# Patient Record
Sex: FEMALE | Race: OTHER | URBAN - METROPOLITAN AREA
[De-identification: names, ages, dates, MRNs, and addresses within clinical notes are randomized per-mention and may not be internally consistent; named-entity substitution may affect disease eponyms.]

---

## 2017-02-23 ENCOUNTER — EMERGENCY (EMERGENCY)
Facility: HOSPITAL | Age: 32
LOS: 0 days | Discharge: HOME | End: 2017-02-23
Attending: EMERGENCY MEDICINE
Payer: MEDICARE

## 2017-02-23 VITALS
WEIGHT: 293 LBS | DIASTOLIC BLOOD PRESSURE: 75 MMHG | TEMPERATURE: 98 F | HEIGHT: 63 IN | RESPIRATION RATE: 16 BRPM | SYSTOLIC BLOOD PRESSURE: 125 MMHG | HEART RATE: 99 BPM | OXYGEN SATURATION: 98 %

## 2017-02-23 DIAGNOSIS — M79.651 PAIN IN RIGHT THIGH: ICD-10-CM

## 2017-02-23 DIAGNOSIS — M25.551 PAIN IN RIGHT HIP: ICD-10-CM

## 2017-02-23 DIAGNOSIS — N83.209 UNSPECIFIED OVARIAN CYST, UNSPECIFIED SIDE: ICD-10-CM

## 2017-02-23 DIAGNOSIS — M54.31 SCIATICA, RIGHT SIDE: ICD-10-CM

## 2017-02-23 PROCEDURE — 93971 EXTREMITY STUDY: CPT | Mod: 26,RT

## 2017-02-23 PROCEDURE — 99284 EMERGENCY DEPT VISIT MOD MDM: CPT

## 2017-02-23 RX ORDER — BACLOFEN 100 %
1 POWDER (GRAM) MISCELLANEOUS
Qty: 20 | Refills: 0
Start: 2017-02-23 | End: 2017-03-05

## 2017-02-23 RX ORDER — TRAMADOL HYDROCHLORIDE 50 MG/1
1 TABLET ORAL
Qty: 18 | Refills: 0
Start: 2017-02-23 | End: 2017-03-01

## 2017-02-23 RX ORDER — METHOCARBAMOL 500 MG/1
750 TABLET, FILM COATED ORAL ONCE
Qty: 0 | Refills: 0 | Status: COMPLETED | OUTPATIENT
Start: 2017-02-23 | End: 2017-02-23

## 2017-02-23 RX ORDER — KETOROLAC TROMETHAMINE 30 MG/ML
30 SYRINGE (ML) INJECTION ONCE
Qty: 0 | Refills: 0 | Status: DISCONTINUED | OUTPATIENT
Start: 2017-02-23 | End: 2017-02-23

## 2017-02-23 RX ADMIN — Medication 30 MILLIGRAM(S): at 14:26

## 2017-02-23 RX ADMIN — Medication 30 MILLIGRAM(S): at 14:27

## 2017-02-23 RX ADMIN — METHOCARBAMOL 750 MILLIGRAM(S): 500 TABLET, FILM COATED ORAL at 14:27

## 2017-02-23 NOTE — ED ADULT NURSE NOTE - ADDITIONAL PRINTED INSTRUCTIONS GIVEN
discharged home, instructed to follow up with primary md or to come back to er if symptoms worsens, verbalized understanding of instrucs

## 2017-02-23 NOTE — ED ADULT NURSE NOTE - OBJECTIVE STATEMENT
patient stated that pain to right groin that radiates to right thigh up to right knee started yesterday, pain has been getting worse today, pain of 10 out of 10 on a scale of 0 to 10

## 2017-02-23 NOTE — ED PROVIDER NOTE - CONSTITUTIONAL, MLM
normal... Well appearing, well nourished, awake, alert, oriented to person, place, time/situation and in no apparent distress. Obese.

## 2017-06-12 ENCOUNTER — EMERGENCY (EMERGENCY)
Facility: HOSPITAL | Age: 32
LOS: 0 days | Discharge: HOME | End: 2017-06-12
Attending: EMERGENCY MEDICINE
Payer: MEDICARE

## 2017-06-12 VITALS
WEIGHT: 293 LBS | RESPIRATION RATE: 18 BRPM | HEART RATE: 89 BPM | SYSTOLIC BLOOD PRESSURE: 128 MMHG | OXYGEN SATURATION: 97 % | HEIGHT: 63 IN | TEMPERATURE: 98 F | DIASTOLIC BLOOD PRESSURE: 66 MMHG

## 2017-06-12 VITALS
TEMPERATURE: 98 F | HEART RATE: 99 BPM | OXYGEN SATURATION: 99 % | DIASTOLIC BLOOD PRESSURE: 81 MMHG | SYSTOLIC BLOOD PRESSURE: 141 MMHG | RESPIRATION RATE: 16 BRPM

## 2017-06-12 PROCEDURE — 99284 EMERGENCY DEPT VISIT MOD MDM: CPT | Mod: 25

## 2017-06-12 RX ORDER — DIAZEPAM 5 MG
5 TABLET ORAL ONCE
Qty: 0 | Refills: 0 | Status: DISCONTINUED | OUTPATIENT
Start: 2017-06-12 | End: 2017-06-12

## 2017-06-12 RX ORDER — CYCLOBENZAPRINE HYDROCHLORIDE 10 MG/1
10 TABLET, FILM COATED ORAL ONCE
Qty: 0 | Refills: 0 | Status: COMPLETED | OUTPATIENT
Start: 2017-06-12 | End: 2017-06-12

## 2017-06-12 RX ORDER — KETOROLAC TROMETHAMINE 30 MG/ML
60 SYRINGE (ML) INJECTION ONCE
Qty: 0 | Refills: 0 | Status: DISCONTINUED | OUTPATIENT
Start: 2017-06-12 | End: 2017-06-12

## 2017-06-12 RX ADMIN — CYCLOBENZAPRINE HYDROCHLORIDE 10 MILLIGRAM(S): 10 TABLET, FILM COATED ORAL at 07:58

## 2017-06-12 RX ADMIN — Medication 60 MILLIGRAM(S): at 09:38

## 2017-06-12 RX ADMIN — Medication 5 MILLIGRAM(S): at 09:38

## 2017-06-12 RX ADMIN — Medication 60 MILLIGRAM(S): at 07:58

## 2017-06-12 NOTE — ED ADULT NURSE NOTE - OBJECTIVE STATEMENT
right lower back pain radiating down leg started last night also has pain to hand history of sciatica. no injury medicated as ordered

## 2017-06-12 NOTE — ED PROVIDER NOTE - PHYSICAL EXAMINATION
GEN: Alert, NAD  HEAD: atraumatic, EOMI, PERRL, normal lids/conjunctiva  ENT: normal hearing, patent oropharynx without erythema/exudate, uvula midline  NECK: +supple, no tenderness/meningismus, +Trachea midline  PULM: Bilateral BS, normal resp effort, no wheeze/stridor/retractions  CV: RRR, no M/R/G, +dist ext pulses  ABD: soft, NT/ND  BACK: no CVAT, no midline tenderness  MSK: no edema/erythema/cyanosis  SKIN: no rash  NEURO: AAOx3, no sensory/motor deficits, CN 2-12 intact  mod R lower back pain w palpation, able to dorsi/plantar flex b/l feet

## 2017-06-12 NOTE — ED PROVIDER NOTE - PRESENTING SYMPTOMS DETAILS
32F w hx of sciatica coems in w acute on chronic pain. started yesterday after sitting in the hospital for another friend in a chair for several hrs. when she got up she states she started to have the pain - Rlower back radiates to R leg. no trauma/falls.  no focal weakness/numbness/incontinence/saddle anesthesia. able to ambulate.   ROS: No fever/chills, No photophobia/eye pain/changes in vision, No ear pain/sore throat/dysphagia, No chest pain/palpitations, no SOB/cough/wheeze/stridor, No abdominal pain/N/V/D, no dysuria/frequency/discharge, No neck pain, no rash, no changes in neurological status/function.

## 2017-06-12 NOTE — ED PROVIDER NOTE - MEDICAL DECISION MAKING DETAILS
pt feels better after pain meds. pt appears well and non-toxic. . VSS. Sx have improved during ED stay. No acute events during ED observation period. Precautions given to return to the ED if sx persist or change. Pt expresses understanding and has no further questions. Pt feels comfortable and wishes to be discharged home. Instructed to follow up with PMD in 24 hrs. ortho

## 2017-06-13 DIAGNOSIS — N83.209 UNSPECIFIED OVARIAN CYST, UNSPECIFIED SIDE: ICD-10-CM

## 2017-06-13 DIAGNOSIS — M54.9 DORSALGIA, UNSPECIFIED: ICD-10-CM

## 2017-06-13 DIAGNOSIS — M54.31 SCIATICA, RIGHT SIDE: ICD-10-CM

## 2017-06-27 ENCOUNTER — EMERGENCY (EMERGENCY)
Facility: HOSPITAL | Age: 32
LOS: 0 days | Discharge: HOME | End: 2017-06-27
Attending: EMERGENCY MEDICINE
Payer: MEDICARE

## 2017-06-27 VITALS
RESPIRATION RATE: 16 BRPM | HEART RATE: 85 BPM | WEIGHT: 293 LBS | SYSTOLIC BLOOD PRESSURE: 135 MMHG | OXYGEN SATURATION: 97 % | HEIGHT: 63 IN | TEMPERATURE: 98 F | DIASTOLIC BLOOD PRESSURE: 82 MMHG

## 2017-06-27 VITALS
RESPIRATION RATE: 17 BRPM | HEART RATE: 89 BPM | OXYGEN SATURATION: 100 % | DIASTOLIC BLOOD PRESSURE: 78 MMHG | TEMPERATURE: 98 F | SYSTOLIC BLOOD PRESSURE: 132 MMHG

## 2017-06-27 LAB
APPEARANCE UR: ABNORMAL
BACTERIA # UR AUTO: ABNORMAL
BILIRUB UR-MCNC: NEGATIVE — SIGNIFICANT CHANGE UP
COLOR SPEC: YELLOW — SIGNIFICANT CHANGE UP
DIFF PNL FLD: ABNORMAL
EPI CELLS # UR: ABNORMAL
GLUCOSE UR QL: NEGATIVE MG/DL — SIGNIFICANT CHANGE UP
HCG UR QL: NEGATIVE — SIGNIFICANT CHANGE UP
HYALINE CASTS # UR AUTO: ABNORMAL /LPF
KETONES UR-MCNC: NEGATIVE — SIGNIFICANT CHANGE UP
LEUKOCYTE ESTERASE UR-ACNC: ABNORMAL
NITRITE UR-MCNC: NEGATIVE — SIGNIFICANT CHANGE UP
PH UR: 5 — SIGNIFICANT CHANGE UP (ref 5–8)
PROT UR-MCNC: 30 MG/DL
RBC CASTS # UR COMP ASSIST: ABNORMAL /HPF (ref 0–4)
SP GR SPEC: 1.02 — SIGNIFICANT CHANGE UP (ref 1.01–1.02)
UROBILINOGEN FLD QL: NEGATIVE MG/DL — SIGNIFICANT CHANGE UP
WBC UR QL: SIGNIFICANT CHANGE UP

## 2017-06-27 PROCEDURE — 72131 CT LUMBAR SPINE W/O DYE: CPT | Mod: 26

## 2017-06-27 PROCEDURE — 99284 EMERGENCY DEPT VISIT MOD MDM: CPT

## 2017-06-27 PROCEDURE — 76377 3D RENDER W/INTRP POSTPROCES: CPT | Mod: 26

## 2017-06-27 RX ORDER — DEXAMETHASONE 0.5 MG/5ML
8 ELIXIR ORAL ONCE
Qty: 0 | Refills: 0 | Status: COMPLETED | OUTPATIENT
Start: 2017-06-27 | End: 2017-06-27

## 2017-06-27 RX ORDER — COLISTIN SULFATE, NEOMYCIN SULFATE, THONZONIUM BROMIDE AND HYDROCORTISONE ACETATE 3; 3.3; .5; 1 MG/ML; MG/ML; MG/ML; MG/ML
3 SUSPENSION AURICULAR (OTIC)
Qty: 1 | Refills: 0
Start: 2017-06-27 | End: 2017-07-04

## 2017-06-27 RX ADMIN — Medication 1 TABLET(S): at 13:26

## 2017-06-27 RX ADMIN — Medication 8 MILLIGRAM(S): at 14:27

## 2017-06-27 NOTE — ED PROVIDER NOTE - CARE PLAN
Principal Discharge DX:	Otitis media  Secondary Diagnosis:	URI (upper respiratory infection)  Secondary Diagnosis:	Sciatica

## 2017-06-27 NOTE — ED PROVIDER NOTE - CONSTITUTIONAL, MLM
normal... Well appearing, well nourished, awake, alert, obese, oriented to person, place, time/situation and in no apparent distress.

## 2017-06-27 NOTE — ED PROVIDER NOTE - OBJECTIVE STATEMENT
33 yo female presents with left ear pain since yesterday, uri symptoms and pain to left side of buttock radiating into left leg. Patient states she had fever last night, palpable. Patient denies cough, abdominal pain, chills, dysuria.

## 2017-06-27 NOTE — ED ADULT TRIAGE NOTE - CHIEF COMPLAINT QUOTE
multiple c/o pain eft hip to foot, left ear pain, numbness both hands denies known injury or known cause

## 2017-06-28 DIAGNOSIS — J06.9 ACUTE UPPER RESPIRATORY INFECTION, UNSPECIFIED: ICD-10-CM

## 2017-06-28 DIAGNOSIS — H66.90 OTITIS MEDIA, UNSPECIFIED, UNSPECIFIED EAR: ICD-10-CM

## 2017-06-28 DIAGNOSIS — M54.30 SCIATICA, UNSPECIFIED SIDE: ICD-10-CM

## 2017-06-28 DIAGNOSIS — N83.209 UNSPECIFIED OVARIAN CYST, UNSPECIFIED SIDE: ICD-10-CM

## 2017-06-28 DIAGNOSIS — F32.9 MAJOR DEPRESSIVE DISORDER, SINGLE EPISODE, UNSPECIFIED: ICD-10-CM

## 2017-06-28 DIAGNOSIS — H92.02 OTALGIA, LEFT EAR: ICD-10-CM

## 2017-08-04 ENCOUNTER — OUTPATIENT (OUTPATIENT)
Dept: OUTPATIENT SERVICES | Facility: HOSPITAL | Age: 32
LOS: 1 days | End: 2017-08-04

## 2017-08-04 ENCOUNTER — APPOINTMENT (OUTPATIENT)
Dept: OBGYN | Facility: HOSPITAL | Age: 32
End: 2017-08-04

## 2017-08-07 DIAGNOSIS — R73.03 PREDIABETES: ICD-10-CM

## 2017-11-07 ENCOUNTER — EMERGENCY (EMERGENCY)
Facility: HOSPITAL | Age: 32
LOS: 0 days | Discharge: HOME | End: 2017-11-07
Attending: EMERGENCY MEDICINE
Payer: MEDICARE

## 2017-11-07 VITALS
RESPIRATION RATE: 18 BRPM | TEMPERATURE: 99 F | DIASTOLIC BLOOD PRESSURE: 77 MMHG | OXYGEN SATURATION: 99 % | SYSTOLIC BLOOD PRESSURE: 139 MMHG | WEIGHT: 289.91 LBS | HEART RATE: 76 BPM | HEIGHT: 63 IN

## 2017-11-07 DIAGNOSIS — F32.9 MAJOR DEPRESSIVE DISORDER, SINGLE EPISODE, UNSPECIFIED: ICD-10-CM

## 2017-11-07 DIAGNOSIS — M25.512 PAIN IN LEFT SHOULDER: ICD-10-CM

## 2017-11-07 DIAGNOSIS — M54.30 SCIATICA, UNSPECIFIED SIDE: ICD-10-CM

## 2017-11-07 DIAGNOSIS — M54.12 RADICULOPATHY, CERVICAL REGION: ICD-10-CM

## 2017-11-07 PROCEDURE — 99283 EMERGENCY DEPT VISIT LOW MDM: CPT

## 2017-11-07 RX ORDER — BACLOFEN 100 %
1 POWDER (GRAM) MISCELLANEOUS
Qty: 20 | Refills: 0
Start: 2017-11-07 | End: 2017-11-17

## 2017-11-07 NOTE — ED PROVIDER NOTE - CONSTITUTIONAL, MLM
normal... Well appearing, well nourished, awake, alert, oriented to person, place, time/situation and in no apparent distress, obese.

## 2017-11-07 NOTE — ED ADULT NURSE NOTE - OBJECTIVE STATEMENT
received ft c/o pain l shoulder radiating down l arm x 2 days with intermittant parathesias l hand denies recent injury or known trauma

## 2017-11-07 NOTE — ED ADULT TRIAGE NOTE - CHIEF COMPLAINT QUOTE
pt complaining of left arm pain, and tingling times 2 days. pt denies injury. denies any medical history.

## 2017-11-10 ENCOUNTER — EMERGENCY (EMERGENCY)
Facility: HOSPITAL | Age: 32
LOS: 0 days | Discharge: HOME | End: 2017-11-11
Attending: EMERGENCY MEDICINE
Payer: MEDICARE

## 2017-11-10 VITALS
OXYGEN SATURATION: 96 % | SYSTOLIC BLOOD PRESSURE: 144 MMHG | WEIGHT: 283.96 LBS | HEART RATE: 100 BPM | TEMPERATURE: 98 F | HEIGHT: 63 IN | DIASTOLIC BLOOD PRESSURE: 81 MMHG

## 2017-11-10 DIAGNOSIS — Z79.891 LONG TERM (CURRENT) USE OF OPIATE ANALGESIC: ICD-10-CM

## 2017-11-10 DIAGNOSIS — N83.209 UNSPECIFIED OVARIAN CYST, UNSPECIFIED SIDE: ICD-10-CM

## 2017-11-10 DIAGNOSIS — Z79.2 LONG TERM (CURRENT) USE OF ANTIBIOTICS: ICD-10-CM

## 2017-11-10 DIAGNOSIS — M54.30 SCIATICA, UNSPECIFIED SIDE: ICD-10-CM

## 2017-11-10 DIAGNOSIS — E66.9 OBESITY, UNSPECIFIED: ICD-10-CM

## 2017-11-10 DIAGNOSIS — F32.9 MAJOR DEPRESSIVE DISORDER, SINGLE EPISODE, UNSPECIFIED: ICD-10-CM

## 2017-11-10 DIAGNOSIS — J02.9 ACUTE PHARYNGITIS, UNSPECIFIED: ICD-10-CM

## 2017-11-10 DIAGNOSIS — R50.9 FEVER, UNSPECIFIED: ICD-10-CM

## 2017-11-10 PROCEDURE — 99283 EMERGENCY DEPT VISIT LOW MDM: CPT | Mod: 25

## 2017-11-10 NOTE — ED ADULT NURSE NOTE - CHIEF COMPLAINT QUOTE
" Today around 530, I started to have a weird pain in my throat, I cannot swallow or talk" Patient denies any drooling, able to make clear sentences

## 2017-11-10 NOTE — ED ADULT NURSE NOTE - OBJECTIVE STATEMENT
patient is a 32yr old female, a&Ox3, per patient " Today around 530, I started to have a weird pain in my throat, I cannot swallow or talk" Patient denies any drooling, able to make clear sentences. no distress/observed

## 2017-11-11 RX ORDER — BENZOCAINE AND MENTHOL 5; 1 G/100ML; G/100ML
1 LIQUID ORAL ONCE
Qty: 0 | Refills: 0 | Status: COMPLETED | OUTPATIENT
Start: 2017-11-11 | End: 2017-11-11

## 2017-11-11 RX ORDER — AZITHROMYCIN 500 MG/1
1 TABLET, FILM COATED ORAL
Qty: 1 | Refills: 0
Start: 2017-11-11

## 2017-11-11 RX ORDER — IBUPROFEN 200 MG
1 TABLET ORAL
Qty: 15 | Refills: 0
Start: 2017-11-11 | End: 2017-11-16

## 2017-11-11 RX ORDER — IBUPROFEN 200 MG
600 TABLET ORAL ONCE
Qty: 0 | Refills: 0 | Status: COMPLETED | OUTPATIENT
Start: 2017-11-11 | End: 2017-11-11

## 2017-11-11 RX ADMIN — BENZOCAINE AND MENTHOL 1 LOZENGE: 5; 1 LIQUID ORAL at 02:01

## 2017-11-11 RX ADMIN — Medication 600 MILLIGRAM(S): at 01:18

## 2017-11-11 RX ADMIN — Medication 600 MILLIGRAM(S): at 01:03

## 2017-11-11 NOTE — ED PROVIDER NOTE - MEDICAL DECISION MAKING DETAILS
Pt likely with viral, no s/s of bacterial. however, pt requesting abx. d/w pt if still not improved, rx for azithro given. Discussed results and outcome of testing with the patient.  Patient given copy of available results. Patient advised to please follow up with their PMD within the next 24 hours and return to the Emergency Department for worsening symptoms or any other concerns.

## 2017-11-11 NOTE — ED PROVIDER NOTE - PHYSICAL EXAMINATION
Gen: Alert, Well appearing. NAD    Head: NC, AT, PERRL, EOMI, normal lids/conjunctiva   ENT: Bilateral TM WNL, normal hearing, patent oropharynx without erythema/exudate, uvula midline. FROm of neck. no trismus. no tongue lifting.  Neck: supple, no tenderness/meningismus/JVD   Pulm: Bilateral clear BS, normal resp effort, no wheeze/stridor/retractions  CV: RRR, no M/R/G, +dist pulses   Abd: soft, NT/ND, +BS, no guarding/rebound tenderness  Mskel: no edema/erythema/cyanosis   Skin: no rash   Neuro: AAOx3, no sensory/motor deficits, CN 2-12 intact

## 2017-11-11 NOTE — ED PROVIDER NOTE - OBJECTIVE STATEMENT
31yo obese female with pmh sciatica presents with left throat pain today and pain with eating. Pt subjective fever. Pt seen 3 days ago for left arm pain and given flexeril, prednisone and also treated for UTI and on abx. no cough, runny nose. No other analgesia taken.    ROS: No fever/chills. No photophobia/eye pain/changes in vision, No ear pain/+sore throat/dysphagia, No chest pain/palpitations. No SOB/cough/stridor. No abdominal pain, N/V/D, no black/bloody bm. No dysuria/frequency/discharge, No headache. No Dizziness.  No rash.  No numbness/tingling/weakness.

## 2017-11-13 LAB
CULTURE RESULTS: SIGNIFICANT CHANGE UP
SPECIMEN SOURCE: SIGNIFICANT CHANGE UP

## 2018-11-28 ENCOUNTER — EMERGENCY (EMERGENCY)
Facility: HOSPITAL | Age: 33
LOS: 0 days | Discharge: HOME | End: 2018-11-29
Attending: EMERGENCY MEDICINE
Payer: MEDICARE

## 2018-11-28 VITALS
SYSTOLIC BLOOD PRESSURE: 147 MMHG | HEART RATE: 70 BPM | WEIGHT: 223.99 LBS | TEMPERATURE: 99 F | RESPIRATION RATE: 19 BRPM | DIASTOLIC BLOOD PRESSURE: 86 MMHG | OXYGEN SATURATION: 100 % | HEIGHT: 63 IN

## 2018-11-28 DIAGNOSIS — Z98.84 BARIATRIC SURGERY STATUS: Chronic | ICD-10-CM

## 2018-11-28 LAB
ALBUMIN SERPL ELPH-MCNC: 3.5 G/DL — SIGNIFICANT CHANGE UP (ref 3.3–5)
ALP SERPL-CCNC: 58 U/L — SIGNIFICANT CHANGE UP (ref 40–120)
ALT FLD-CCNC: 18 U/L — SIGNIFICANT CHANGE UP (ref 12–78)
ANION GAP SERPL CALC-SCNC: 9 MMOL/L — SIGNIFICANT CHANGE UP (ref 5–17)
APTT BLD: 42.9 SEC — HIGH (ref 28.5–37)
AST SERPL-CCNC: 22 U/L — SIGNIFICANT CHANGE UP (ref 15–37)
BASOPHILS # BLD AUTO: 0.05 K/UL — SIGNIFICANT CHANGE UP (ref 0–0.2)
BASOPHILS NFR BLD AUTO: 0.5 % — SIGNIFICANT CHANGE UP (ref 0–2)
BILIRUB SERPL-MCNC: 0.7 MG/DL — SIGNIFICANT CHANGE UP (ref 0.2–1.2)
BUN SERPL-MCNC: 13 MG/DL — SIGNIFICANT CHANGE UP (ref 7–23)
CALCIUM SERPL-MCNC: 8.4 MG/DL — LOW (ref 8.5–10.1)
CHLORIDE SERPL-SCNC: 108 MMOL/L — SIGNIFICANT CHANGE UP (ref 96–108)
CK MB CFR SERPL CALC: <1 NG/ML — SIGNIFICANT CHANGE UP (ref 0.5–3.6)
CO2 SERPL-SCNC: 23 MMOL/L — SIGNIFICANT CHANGE UP (ref 22–31)
CREAT SERPL-MCNC: 0.63 MG/DL — SIGNIFICANT CHANGE UP (ref 0.5–1.3)
D DIMER BLD IA.RAPID-MCNC: <150 NG/ML DDU — SIGNIFICANT CHANGE UP
EOSINOPHIL # BLD AUTO: 0.25 K/UL — SIGNIFICANT CHANGE UP (ref 0–0.5)
EOSINOPHIL NFR BLD AUTO: 2.5 % — SIGNIFICANT CHANGE UP (ref 0–6)
GLUCOSE SERPL-MCNC: 94 MG/DL — SIGNIFICANT CHANGE UP (ref 70–99)
HCT VFR BLD CALC: 35.9 % — SIGNIFICANT CHANGE UP (ref 34.5–45)
HGB BLD-MCNC: 11.7 G/DL — SIGNIFICANT CHANGE UP (ref 11.5–15.5)
IMM GRANULOCYTES NFR BLD AUTO: 0.2 % — SIGNIFICANT CHANGE UP (ref 0–1.5)
INR BLD: 1.16 RATIO — SIGNIFICANT CHANGE UP (ref 0.88–1.16)
LIDOCAIN IGE QN: 129 U/L — SIGNIFICANT CHANGE UP (ref 73–393)
LYMPHOCYTES # BLD AUTO: 4.05 K/UL — HIGH (ref 1–3.3)
LYMPHOCYTES # BLD AUTO: 40.3 % — SIGNIFICANT CHANGE UP (ref 13–44)
MCHC RBC-ENTMCNC: 28.5 PG — SIGNIFICANT CHANGE UP (ref 27–34)
MCHC RBC-ENTMCNC: 32.6 GM/DL — SIGNIFICANT CHANGE UP (ref 32–36)
MCV RBC AUTO: 87.3 FL — SIGNIFICANT CHANGE UP (ref 80–100)
MONOCYTES # BLD AUTO: 0.53 K/UL — SIGNIFICANT CHANGE UP (ref 0–0.9)
MONOCYTES NFR BLD AUTO: 5.3 % — SIGNIFICANT CHANGE UP (ref 2–14)
NEUTROPHILS # BLD AUTO: 5.16 K/UL — SIGNIFICANT CHANGE UP (ref 1.8–7.4)
NEUTROPHILS NFR BLD AUTO: 51.2 % — SIGNIFICANT CHANGE UP (ref 43–77)
NRBC # BLD: 0 /100 WBCS — SIGNIFICANT CHANGE UP (ref 0–0)
NT-PROBNP SERPL-SCNC: 58 PG/ML — SIGNIFICANT CHANGE UP (ref 0–125)
PLATELET # BLD AUTO: 360 K/UL — SIGNIFICANT CHANGE UP (ref 150–400)
POTASSIUM SERPL-MCNC: 3.9 MMOL/L — SIGNIFICANT CHANGE UP (ref 3.5–5.3)
POTASSIUM SERPL-SCNC: 3.9 MMOL/L — SIGNIFICANT CHANGE UP (ref 3.5–5.3)
PROT SERPL-MCNC: 7.5 GM/DL — SIGNIFICANT CHANGE UP (ref 6–8.3)
PROTHROM AB SERPL-ACNC: 13.1 SEC — HIGH (ref 10–12.9)
RBC # BLD: 4.11 M/UL — SIGNIFICANT CHANGE UP (ref 3.8–5.2)
RBC # FLD: 13.7 % — SIGNIFICANT CHANGE UP (ref 10.3–14.5)
SODIUM SERPL-SCNC: 140 MMOL/L — SIGNIFICANT CHANGE UP (ref 135–145)
TROPONIN I SERPL-MCNC: <.015 NG/ML — SIGNIFICANT CHANGE UP (ref 0.01–0.04)
WBC # BLD: 10.06 K/UL — SIGNIFICANT CHANGE UP (ref 3.8–10.5)
WBC # FLD AUTO: 10.06 K/UL — SIGNIFICANT CHANGE UP (ref 3.8–10.5)

## 2018-11-28 PROCEDURE — 99284 EMERGENCY DEPT VISIT MOD MDM: CPT

## 2018-11-28 PROCEDURE — 71045 X-RAY EXAM CHEST 1 VIEW: CPT | Mod: 26

## 2018-11-28 NOTE — ED ADULT NURSE REASSESSMENT NOTE - NS ED NURSE REASSESS COMMENT FT1
patient update on status of visit. Sitting in [place] with GF. Brought in to 27 at this time. No distress noted. Patient and GF ate [employer] while in [place]

## 2018-11-28 NOTE — ED ADULT TRIAGE NOTE - CHIEF COMPLAINT QUOTE
Patient states feeling numbness in left arm and pain a few days ago but yesterday pain became worst and radiating to neck/jaw. Now also having chest pain. Denies any N/V but confirms headaches 7/10 periodically.  GF states hands gets numb and veins become more prominent. Has been massaging her neck to help

## 2018-11-29 VITALS
HEART RATE: 72 BPM | TEMPERATURE: 98 F | OXYGEN SATURATION: 100 % | DIASTOLIC BLOOD PRESSURE: 80 MMHG | SYSTOLIC BLOOD PRESSURE: 126 MMHG | RESPIRATION RATE: 16 BRPM

## 2018-11-29 PROBLEM — F32.9 MAJOR DEPRESSIVE DISORDER, SINGLE EPISODE, UNSPECIFIED: Chronic | Status: ACTIVE | Noted: 2017-06-27

## 2018-11-29 LAB
APPEARANCE UR: CLEAR — SIGNIFICANT CHANGE UP
BILIRUB UR-MCNC: NEGATIVE — SIGNIFICANT CHANGE UP
COLOR SPEC: YELLOW — SIGNIFICANT CHANGE UP
DIFF PNL FLD: ABNORMAL
EPI CELLS # UR: ABNORMAL
GLUCOSE UR QL: NEGATIVE MG/DL — SIGNIFICANT CHANGE UP
KETONES UR-MCNC: ABNORMAL
LEUKOCYTE ESTERASE UR-ACNC: NEGATIVE — SIGNIFICANT CHANGE UP
NITRITE UR-MCNC: NEGATIVE — SIGNIFICANT CHANGE UP
PH UR: 5 — SIGNIFICANT CHANGE UP (ref 5–8)
PROT UR-MCNC: 30 MG/DL
RBC CASTS # UR COMP ASSIST: SIGNIFICANT CHANGE UP /HPF (ref 0–4)
SP GR SPEC: 1.02 — SIGNIFICANT CHANGE UP (ref 1.01–1.02)
UROBILINOGEN FLD QL: 1 MG/DL
WBC UR QL: SIGNIFICANT CHANGE UP

## 2018-11-29 RX ORDER — CYCLOBENZAPRINE HYDROCHLORIDE 10 MG/1
1 TABLET, FILM COATED ORAL
Qty: 15 | Refills: 0
Start: 2018-11-29 | End: 2018-12-03

## 2018-11-29 RX ORDER — CYCLOBENZAPRINE HYDROCHLORIDE 10 MG/1
10 TABLET, FILM COATED ORAL ONCE
Qty: 0 | Refills: 0 | Status: COMPLETED | OUTPATIENT
Start: 2018-11-29 | End: 2018-11-29

## 2018-11-29 RX ORDER — IBUPROFEN 200 MG
1 TABLET ORAL
Qty: 28 | Refills: 0
Start: 2018-11-29 | End: 2018-12-05

## 2018-11-29 RX ORDER — IBUPROFEN 200 MG
600 TABLET ORAL ONCE
Qty: 0 | Refills: 0 | Status: COMPLETED | OUTPATIENT
Start: 2018-11-29 | End: 2018-11-29

## 2018-11-29 RX ADMIN — CYCLOBENZAPRINE HYDROCHLORIDE 10 MILLIGRAM(S): 10 TABLET, FILM COATED ORAL at 02:29

## 2018-11-29 RX ADMIN — Medication 600 MILLIGRAM(S): at 02:30

## 2018-11-29 RX ADMIN — Medication 600 MILLIGRAM(S): at 03:12

## 2018-11-29 NOTE — ED PROVIDER NOTE - OBJECTIVE STATEMENT
Pt is a 32 yo lady with a pmhx of gastric sleeve, arthritis who presents to the ED with neck pain shooting down arm. Has been having this pain for the past week, constant. Does have some involvement of upper chest, but no other chest pain. No sob, no pleuritic pain, no intermittent pain. No abdominal pain. Has had this issue in the past, was told she had nerve impingement, did not f/u w orthopedics. No numbness or weakness. No hx of dvt/pe, not on ocp.

## 2018-11-29 NOTE — ED ADULT NURSE REASSESSMENT NOTE - NS ED NURSE REASSESS COMMENT FT1
Assuming patient's care for coverage. Report received from RN and patient informed during rounding. Assessment available on KB. Will continue to monitor

## 2018-11-29 NOTE — ED ADULT NURSE NOTE - OBJECTIVE STATEMENT
Patient c/o pain, numbness and intermittent swelling in left arm that radiates from hand up the arm and across left anterior chest wall.

## 2018-11-29 NOTE — ED PROVIDER NOTE - MEDICAL DECISION MAKING DETAILS
Ddx: 1 wk constant pain not consistent with acs or pe/ radiculopathy/ no trauma or midline pain to suggest fx  Plan: cbc, cmp, troponin, ddimer, ecg, cxr, reassess

## 2018-11-30 DIAGNOSIS — R07.9 CHEST PAIN, UNSPECIFIED: ICD-10-CM

## 2018-11-30 DIAGNOSIS — M54.12 RADICULOPATHY, CERVICAL REGION: ICD-10-CM

## 2018-11-30 DIAGNOSIS — F32.9 MAJOR DEPRESSIVE DISORDER, SINGLE EPISODE, UNSPECIFIED: ICD-10-CM

## 2018-11-30 DIAGNOSIS — Z98.84 BARIATRIC SURGERY STATUS: ICD-10-CM

## 2018-11-30 DIAGNOSIS — M54.30 SCIATICA, UNSPECIFIED SIDE: ICD-10-CM

## 2018-11-30 DIAGNOSIS — N83.209 UNSPECIFIED OVARIAN CYST, UNSPECIFIED SIDE: ICD-10-CM

## 2018-11-30 LAB
CULTURE RESULTS: NO GROWTH — SIGNIFICANT CHANGE UP
SPECIMEN SOURCE: SIGNIFICANT CHANGE UP

## 2019-02-05 ENCOUNTER — TRANSCRIPTION ENCOUNTER (OUTPATIENT)
Age: 34
End: 2019-02-05

## 2019-02-05 ENCOUNTER — EMERGENCY (EMERGENCY)
Facility: HOSPITAL | Age: 34
LOS: 0 days | Discharge: ANOTHER HOSPITAL | End: 2019-02-06
Attending: EMERGENCY MEDICINE
Payer: MEDICARE

## 2019-02-05 VITALS
TEMPERATURE: 99 F | HEART RATE: 76 BPM | RESPIRATION RATE: 18 BRPM | OXYGEN SATURATION: 99 % | SYSTOLIC BLOOD PRESSURE: 145 MMHG | HEIGHT: 63 IN | WEIGHT: 210.1 LBS | DIASTOLIC BLOOD PRESSURE: 90 MMHG

## 2019-02-05 DIAGNOSIS — Z98.84 BARIATRIC SURGERY STATUS: Chronic | ICD-10-CM

## 2019-02-05 LAB
ALBUMIN SERPL ELPH-MCNC: 3.4 G/DL — SIGNIFICANT CHANGE UP (ref 3.3–5)
ALP SERPL-CCNC: 63 U/L — SIGNIFICANT CHANGE UP (ref 40–120)
ALT FLD-CCNC: 14 U/L — SIGNIFICANT CHANGE UP (ref 12–78)
AMYLASE P1 CFR SERPL: 39 U/L — SIGNIFICANT CHANGE UP (ref 25–115)
ANION GAP SERPL CALC-SCNC: 9 MMOL/L — SIGNIFICANT CHANGE UP (ref 5–17)
APPEARANCE UR: CLEAR — SIGNIFICANT CHANGE UP
APTT BLD: 39.3 SEC — HIGH (ref 27.5–36.3)
AST SERPL-CCNC: 11 U/L — LOW (ref 15–37)
BACTERIA # UR AUTO: ABNORMAL
BASOPHILS # BLD AUTO: 0.03 K/UL — SIGNIFICANT CHANGE UP (ref 0–0.2)
BASOPHILS NFR BLD AUTO: 0.3 % — SIGNIFICANT CHANGE UP (ref 0–2)
BILIRUB SERPL-MCNC: 0.6 MG/DL — SIGNIFICANT CHANGE UP (ref 0.2–1.2)
BILIRUB UR-MCNC: NEGATIVE — SIGNIFICANT CHANGE UP
BLD GP AB SCN SERPL QL: SIGNIFICANT CHANGE UP
BUN SERPL-MCNC: 11 MG/DL — SIGNIFICANT CHANGE UP (ref 7–23)
CALCIUM SERPL-MCNC: 8.6 MG/DL — SIGNIFICANT CHANGE UP (ref 8.5–10.1)
CHLORIDE SERPL-SCNC: 108 MMOL/L — SIGNIFICANT CHANGE UP (ref 96–108)
CO2 SERPL-SCNC: 23 MMOL/L — SIGNIFICANT CHANGE UP (ref 22–31)
COLOR SPEC: YELLOW — SIGNIFICANT CHANGE UP
CREAT SERPL-MCNC: 0.49 MG/DL — LOW (ref 0.5–1.3)
DIFF PNL FLD: ABNORMAL
EOSINOPHIL # BLD AUTO: 0.02 K/UL — SIGNIFICANT CHANGE UP (ref 0–0.5)
EOSINOPHIL NFR BLD AUTO: 0.2 % — SIGNIFICANT CHANGE UP (ref 0–6)
EPI CELLS # UR: ABNORMAL
GLUCOSE SERPL-MCNC: 105 MG/DL — HIGH (ref 70–99)
GLUCOSE UR QL: NEGATIVE MG/DL — SIGNIFICANT CHANGE UP
HCG SERPL-ACNC: <1 MIU/ML — SIGNIFICANT CHANGE UP
HCG UR QL: NEGATIVE — SIGNIFICANT CHANGE UP
HCT VFR BLD CALC: 35 % — SIGNIFICANT CHANGE UP (ref 34.5–45)
HGB BLD-MCNC: 11.3 G/DL — LOW (ref 11.5–15.5)
IMM GRANULOCYTES NFR BLD AUTO: 0.2 % — SIGNIFICANT CHANGE UP (ref 0–1.5)
INR BLD: 1.16 RATIO — SIGNIFICANT CHANGE UP (ref 0.88–1.16)
KETONES UR-MCNC: NEGATIVE — SIGNIFICANT CHANGE UP
LEUKOCYTE ESTERASE UR-ACNC: NEGATIVE — SIGNIFICANT CHANGE UP
LIDOCAIN IGE QN: 80 U/L — SIGNIFICANT CHANGE UP (ref 73–393)
LYMPHOCYTES # BLD AUTO: 2.45 K/UL — SIGNIFICANT CHANGE UP (ref 1–3.3)
LYMPHOCYTES # BLD AUTO: 22.9 % — SIGNIFICANT CHANGE UP (ref 13–44)
MCHC RBC-ENTMCNC: 28.8 PG — SIGNIFICANT CHANGE UP (ref 27–34)
MCHC RBC-ENTMCNC: 32.3 GM/DL — SIGNIFICANT CHANGE UP (ref 32–36)
MCV RBC AUTO: 89.1 FL — SIGNIFICANT CHANGE UP (ref 80–100)
MONOCYTES # BLD AUTO: 0.38 K/UL — SIGNIFICANT CHANGE UP (ref 0–0.9)
MONOCYTES NFR BLD AUTO: 3.6 % — SIGNIFICANT CHANGE UP (ref 2–14)
NEUTROPHILS # BLD AUTO: 7.78 K/UL — HIGH (ref 1.8–7.4)
NEUTROPHILS NFR BLD AUTO: 72.8 % — SIGNIFICANT CHANGE UP (ref 43–77)
NITRITE UR-MCNC: NEGATIVE — SIGNIFICANT CHANGE UP
NRBC # BLD: 0 /100 WBCS — SIGNIFICANT CHANGE UP (ref 0–0)
PH UR: 7 — SIGNIFICANT CHANGE UP (ref 5–8)
PLATELET # BLD AUTO: 393 K/UL — SIGNIFICANT CHANGE UP (ref 150–400)
POTASSIUM SERPL-MCNC: 3.9 MMOL/L — SIGNIFICANT CHANGE UP (ref 3.5–5.3)
POTASSIUM SERPL-SCNC: 3.9 MMOL/L — SIGNIFICANT CHANGE UP (ref 3.5–5.3)
PROT SERPL-MCNC: 7.7 GM/DL — SIGNIFICANT CHANGE UP (ref 6–8.3)
PROT UR-MCNC: NEGATIVE MG/DL — SIGNIFICANT CHANGE UP
PROTHROM AB SERPL-ACNC: 13.1 SEC — HIGH (ref 10–12.9)
RBC # BLD: 3.93 M/UL — SIGNIFICANT CHANGE UP (ref 3.8–5.2)
RBC # FLD: 13.4 % — SIGNIFICANT CHANGE UP (ref 10.3–14.5)
RBC CASTS # UR COMP ASSIST: ABNORMAL /HPF (ref 0–4)
SODIUM SERPL-SCNC: 140 MMOL/L — SIGNIFICANT CHANGE UP (ref 135–145)
SP GR SPEC: 1 — LOW (ref 1.01–1.02)
UROBILINOGEN FLD QL: NEGATIVE MG/DL — SIGNIFICANT CHANGE UP
WBC # BLD: 10.68 K/UL — HIGH (ref 3.8–10.5)
WBC # FLD AUTO: 10.68 K/UL — HIGH (ref 3.8–10.5)
WBC UR QL: SIGNIFICANT CHANGE UP

## 2019-02-05 PROCEDURE — 99285 EMERGENCY DEPT VISIT HI MDM: CPT

## 2019-02-05 PROCEDURE — 74177 CT ABD & PELVIS W/CONTRAST: CPT | Mod: 26

## 2019-02-05 PROCEDURE — 76830 TRANSVAGINAL US NON-OB: CPT | Mod: 26

## 2019-02-05 RX ORDER — SODIUM CHLORIDE 9 MG/ML
1000 INJECTION INTRAMUSCULAR; INTRAVENOUS; SUBCUTANEOUS ONCE
Qty: 0 | Refills: 0 | Status: COMPLETED | OUTPATIENT
Start: 2019-02-05 | End: 2019-02-05

## 2019-02-05 RX ORDER — MORPHINE SULFATE 50 MG/1
4 CAPSULE, EXTENDED RELEASE ORAL ONCE
Qty: 0 | Refills: 0 | Status: DISCONTINUED | OUTPATIENT
Start: 2019-02-05 | End: 2019-02-05

## 2019-02-05 RX ORDER — ONDANSETRON 8 MG/1
4 TABLET, FILM COATED ORAL ONCE
Qty: 0 | Refills: 0 | Status: COMPLETED | OUTPATIENT
Start: 2019-02-05 | End: 2019-02-05

## 2019-02-05 RX ORDER — IOHEXOL 300 MG/ML
30 INJECTION, SOLUTION INTRAVENOUS ONCE
Qty: 0 | Refills: 0 | Status: COMPLETED | OUTPATIENT
Start: 2019-02-05 | End: 2019-02-05

## 2019-02-05 RX ORDER — MORPHINE SULFATE 50 MG/1
2 CAPSULE, EXTENDED RELEASE ORAL ONCE
Qty: 0 | Refills: 0 | Status: DISCONTINUED | OUTPATIENT
Start: 2019-02-05 | End: 2019-02-05

## 2019-02-05 RX ADMIN — SODIUM CHLORIDE 1000 MILLILITER(S): 9 INJECTION INTRAMUSCULAR; INTRAVENOUS; SUBCUTANEOUS at 20:04

## 2019-02-05 RX ADMIN — IOHEXOL 30 MILLILITER(S): 300 INJECTION, SOLUTION INTRAVENOUS at 17:43

## 2019-02-05 RX ADMIN — ONDANSETRON 4 MILLIGRAM(S): 8 TABLET, FILM COATED ORAL at 17:48

## 2019-02-05 RX ADMIN — MORPHINE SULFATE 2 MILLIGRAM(S): 50 CAPSULE, EXTENDED RELEASE ORAL at 21:00

## 2019-02-05 RX ADMIN — MORPHINE SULFATE 4 MILLIGRAM(S): 50 CAPSULE, EXTENDED RELEASE ORAL at 17:48

## 2019-02-05 RX ADMIN — MORPHINE SULFATE 2 MILLIGRAM(S): 50 CAPSULE, EXTENDED RELEASE ORAL at 21:15

## 2019-02-05 RX ADMIN — SODIUM CHLORIDE 2000 MILLILITER(S): 9 INJECTION INTRAMUSCULAR; INTRAVENOUS; SUBCUTANEOUS at 20:03

## 2019-02-05 RX ADMIN — SODIUM CHLORIDE 2000 MILLILITER(S): 9 INJECTION INTRAMUSCULAR; INTRAVENOUS; SUBCUTANEOUS at 17:46

## 2019-02-05 NOTE — ED PROVIDER NOTE - OBJECTIVE STATEMENT
34yo female with history of gastric sleeve presents with right sided flank and abdominal pain since this morning. Patient denies chest pain, sob, nausea, vomiting, hematuria, diarrhea. Pain 10/10 at presents.

## 2019-02-05 NOTE — ED ADULT NURSE NOTE - CHIEF COMPLAINT QUOTE
pt complaining of right lower quadrant  abdominal pain, radiating to back, nausea and vomiting since 10 am. history of diverticulitis and weight loss surgery.

## 2019-02-05 NOTE — ED ADULT TRIAGE NOTE - CHIEF COMPLAINT QUOTE
pt complaining of  abdominal pain, radiating to back, nausea and vomiting since 10 am. history of diverticulitis and weight loss surgery. pt complaining of right lower quadrant  abdominal pain, radiating to back, nausea and vomiting since 10 am. history of diverticulitis and weight loss surgery.

## 2019-02-05 NOTE — ED ADULT NURSE NOTE - CHPI ED NUR SYMPTOMS NEG
no abdominal distension/no burning urination/no diarrhea/no fever/no chills/no blood in stool/no hematuria/no dysuria

## 2019-02-05 NOTE — ED PROVIDER NOTE - PROGRESS NOTE DETAILS
gyn paged gyn paged, case discussed with Dr. Pena, r/o torsion Salvatore: patient signed out by Dr. Regan pending US. US reviewed and discussed with Dr. Das who recommends re-evaluation of patient with repeat US in a couple hours. Patient given morphine. Salvatore: repeat US reviewed and discussed with Dr. Das. He will come see the patient. Salvatore: Dr. Das still unable to see patient thus patient will now transferred to [facility] for further management under gyn, Dr. Adamson.

## 2019-02-05 NOTE — ED ADULT NURSE NOTE - OBJECTIVE STATEMENT
Pt c/o rt flank pain radiating to rt groin since 10 am with N/V, denies diarrhea.  Ate last night at approx 9pm, last BM this am.   Tylenol taken at home at approx 9 am.

## 2019-02-06 ENCOUNTER — RESULT REVIEW (OUTPATIENT)
Age: 34
End: 2019-02-06

## 2019-02-06 ENCOUNTER — INPATIENT (INPATIENT)
Facility: HOSPITAL | Age: 34
LOS: 0 days | Discharge: HOME | End: 2019-02-06
Attending: OBSTETRICS & GYNECOLOGY | Admitting: OBSTETRICS & GYNECOLOGY
Payer: MEDICARE

## 2019-02-06 VITALS
DIASTOLIC BLOOD PRESSURE: 79 MMHG | OXYGEN SATURATION: 100 % | TEMPERATURE: 98 F | HEART RATE: 73 BPM | SYSTOLIC BLOOD PRESSURE: 121 MMHG | RESPIRATION RATE: 18 BRPM

## 2019-02-06 VITALS
OXYGEN SATURATION: 100 % | DIASTOLIC BLOOD PRESSURE: 78 MMHG | SYSTOLIC BLOOD PRESSURE: 135 MMHG | TEMPERATURE: 98 F | HEART RATE: 77 BPM | RESPIRATION RATE: 18 BRPM

## 2019-02-06 VITALS — DIASTOLIC BLOOD PRESSURE: 79 MMHG | HEART RATE: 80 BPM | SYSTOLIC BLOOD PRESSURE: 116 MMHG | OXYGEN SATURATION: 100 %

## 2019-02-06 DIAGNOSIS — N83.20 UNSPECIFIED OVARIAN CYSTS: ICD-10-CM

## 2019-02-06 DIAGNOSIS — Z98.84 BARIATRIC SURGERY STATUS: Chronic | ICD-10-CM

## 2019-02-06 DIAGNOSIS — N83.209 UNSPECIFIED OVARIAN CYST, UNSPECIFIED SIDE: ICD-10-CM

## 2019-02-06 LAB
BLD GP AB SCN SERPL QL: NEGATIVE — SIGNIFICANT CHANGE UP
CULTURE RESULTS: SIGNIFICANT CHANGE UP
RH IG SCN BLD-IMP: POSITIVE — SIGNIFICANT CHANGE UP
RH IG SCN BLD-IMP: POSITIVE — SIGNIFICANT CHANGE UP
SPECIMEN SOURCE: SIGNIFICANT CHANGE UP

## 2019-02-06 PROCEDURE — 88112 CYTOPATH CELL ENHANCE TECH: CPT | Mod: 26

## 2019-02-06 PROCEDURE — 58925 REMOVAL OF OVARIAN CYST(S): CPT

## 2019-02-06 PROCEDURE — 88307 TISSUE EXAM BY PATHOLOGIST: CPT | Mod: 26

## 2019-02-06 PROCEDURE — 76856 US EXAM PELVIC COMPLETE: CPT | Mod: 26

## 2019-02-06 RX ORDER — SODIUM CHLORIDE 9 MG/ML
1000 INJECTION, SOLUTION INTRAVENOUS
Qty: 0 | Refills: 0 | Status: DISCONTINUED | OUTPATIENT
Start: 2019-02-06 | End: 2019-02-06

## 2019-02-06 RX ORDER — HYDROMORPHONE HYDROCHLORIDE 2 MG/ML
1 INJECTION INTRAMUSCULAR; INTRAVENOUS; SUBCUTANEOUS ONCE
Qty: 0 | Refills: 0 | Status: DISCONTINUED | OUTPATIENT
Start: 2019-02-06 | End: 2019-02-06

## 2019-02-06 RX ORDER — MORPHINE SULFATE 50 MG/1
4 CAPSULE, EXTENDED RELEASE ORAL ONCE
Qty: 0 | Refills: 0 | Status: DISCONTINUED | OUTPATIENT
Start: 2019-02-06 | End: 2019-02-06

## 2019-02-06 RX ORDER — OXYCODONE HYDROCHLORIDE 5 MG/1
1 TABLET ORAL
Qty: 10 | Refills: 0
Start: 2019-02-06

## 2019-02-06 RX ORDER — OXYCODONE HYDROCHLORIDE 5 MG/1
1 TABLET ORAL
Qty: 10 | Refills: 0 | OUTPATIENT
Start: 2019-02-06

## 2019-02-06 RX ORDER — ONDANSETRON 8 MG/1
4 TABLET, FILM COATED ORAL ONCE
Qty: 0 | Refills: 0 | Status: COMPLETED | OUTPATIENT
Start: 2019-02-06 | End: 2019-02-06

## 2019-02-06 RX ORDER — HYDROMORPHONE HYDROCHLORIDE 2 MG/ML
0.5 INJECTION INTRAMUSCULAR; INTRAVENOUS; SUBCUTANEOUS ONCE
Qty: 0 | Refills: 0 | Status: DISCONTINUED | OUTPATIENT
Start: 2019-02-06 | End: 2019-02-06

## 2019-02-06 RX ADMIN — HYDROMORPHONE HYDROCHLORIDE 1 MILLIGRAM(S): 2 INJECTION INTRAMUSCULAR; INTRAVENOUS; SUBCUTANEOUS at 13:35

## 2019-02-06 RX ADMIN — MORPHINE SULFATE 4 MILLIGRAM(S): 50 CAPSULE, EXTENDED RELEASE ORAL at 01:16

## 2019-02-06 RX ADMIN — MORPHINE SULFATE 4 MILLIGRAM(S): 50 CAPSULE, EXTENDED RELEASE ORAL at 05:30

## 2019-02-06 RX ADMIN — HYDROMORPHONE HYDROCHLORIDE 1 MILLIGRAM(S): 2 INJECTION INTRAMUSCULAR; INTRAVENOUS; SUBCUTANEOUS at 10:56

## 2019-02-06 RX ADMIN — MORPHINE SULFATE 4 MILLIGRAM(S): 50 CAPSULE, EXTENDED RELEASE ORAL at 01:06

## 2019-02-06 RX ADMIN — MORPHINE SULFATE 4 MILLIGRAM(S): 50 CAPSULE, EXTENDED RELEASE ORAL at 05:00

## 2019-02-06 RX ADMIN — HYDROMORPHONE HYDROCHLORIDE 0.5 MILLIGRAM(S): 2 INJECTION INTRAMUSCULAR; INTRAVENOUS; SUBCUTANEOUS at 08:52

## 2019-02-06 RX ADMIN — ONDANSETRON 4 MILLIGRAM(S): 8 TABLET, FILM COATED ORAL at 10:56

## 2019-02-06 RX ADMIN — SODIUM CHLORIDE 125 MILLILITER(S): 9 INJECTION, SOLUTION INTRAVENOUS at 13:35

## 2019-02-06 RX ADMIN — SODIUM CHLORIDE 1000 MILLILITER(S): 9 INJECTION INTRAMUSCULAR; INTRAVENOUS; SUBCUTANEOUS at 00:10

## 2019-02-06 RX ADMIN — HYDROMORPHONE HYDROCHLORIDE 0.5 MILLIGRAM(S): 2 INJECTION INTRAMUSCULAR; INTRAVENOUS; SUBCUTANEOUS at 13:35

## 2019-02-06 NOTE — CHART NOTE - NSCHARTNOTEFT_GEN_A_CORE
R2 GYN Chart Note     Patient seen and examined at bedside, recently post-op. No acute complaints at this time. Reports no pain at this time. Ambulated to restroom without difficulty. Denies CP, SOB, N/V, fevers, and chills.    Vital Signs Last 24 Hours  T(C): 36.8 (02-06-19 @ 20:00), Max: 36.9 (02-06-19 @ 07:29)  HR: 82 (02-06-19 @ 20:00) (65 - 83)  BP: 121/74 (02-06-19 @ 20:00) (104/71 - 135/78)  RR: 20 (02-06-19 @ 20:00) (12 - 20)  SpO2: 100% (02-06-19 @ 20:00) (95% - 100%)    I&O's Summary    06 Feb 2019 07:01  -  06 Feb 2019 20:36  --------------------------------------------------------  IN: 500 mL / OUT: 250 mL / NET: 250 mL      Physical Exam:  General: NAD  CV: NR, RR, S1, S2, no M/R/G  Lungs: CTA-B  Abdomen: Soft, non-tender, non-distended, +BS  Incision: CDI  Ext: No pain or swelling    Labs:             11.3<L>  10.68<H> )-----------( 393      ( 02-05 @ 17:59 )             35.0         MEDICATIONS  (STANDING):  lactated ringers. 1000 milliLiter(s) (125 mL/Hr) IV Continuous <Continuous>    MEDICATIONS  (PRN):      32yo POD#0 from [facility] right ovarian cystectomy and partial oophorectomy, in stable condition.   Neuro: c/w oral analgesics   CV: VSS  Pulm: Saturating well on room air, encourage incentive spirometry  GI: Regular diet   : s/p spontaneous void 250cc   Heme: Ambulation for DVT prophylaxis   Dispo: Reviewed precautions and discharge instructions with patient, all questions answered and addressed.     RAJI Sagastume, PGY-2

## 2019-02-06 NOTE — ASU DISCHARGE PLAN (ADULT/PEDIATRIC). - ITEMS TO FOLLOWUP WITH YOUR PHYSICIAN'S
Return to your regular way of eating.  Resume normal activity as tolerated, but no heavy lifting or strenuous activity for 6 weeks.  No driving for next 2 weeks and/or while on narcotic pain medication.  Complete vaginal rest, no tampons, no douching, no tub bathing, no sexual activities for 6 weeks unless otherwise instructed by your doctor.  Call your doctor with any signs and symptoms of infection such as fever, chills, nausea or vomiting.  Call your doctor with redness or swelling at the incision site, fluid leakage or wound separation.  Call your doctor if you're unable to tolerate food or have difficulty urinating.  Call your doctor if you have pain that is not relieved by your prescribed medications.  Notify your doctor with any other concerns.  Call [phone] to follow-up with HC in 2-3 weeks.

## 2019-02-06 NOTE — CONSULT NOTE ADULT - SUBJECTIVE AND OBJECTIVE BOX
R2 GYN CONSULT NOTE    33y  LMP 19 transferred from [place] for r/o torsion.  Patient initially presented to [place] after sudden onset RLQ abd pain.  States pain came on suddenly yesterday around 10pm, is sharp in nature like severe cramps, 10/10, exacerbated by movement, alleviated to a 7/10 w morphine.  Endorses chills, nausea. vomited 3 times since yesterday, also had an episode of emesis the day prior to pain onset, denies fevers, emesis,     Of note, pt states she has known she had ovarian cyst.  States was initially found during imaging for gastric sleeve.  [facility] surgeon saw the cyst during the surgery in May 2018, and it was approximately 4-5cm.  Pt was told to f/u w GYN, but has not.  [facility] last GYN visit was approx 2 years ago, w Dr. Joan Jansen who has since retired.    OB/GYN HISTORY:     Surgical History:    Bariatric surgery status  No significant past surgical history      Past Medical History:   Depression  Sciatica  Ovarian cyst  No pertinent past medical history      No Known Allergies          FAMILY HISTORY:  No pertinent family history in first degree relatives      Social History:     REVIEW OF SYSTEMS:    CONSTITUTIONAL: No fever, weight loss, or fatigue  EYES: No eye pain, visual disturbances, or discharge  ENMT:  No difficulty hearing, tinnitus, vertigo; No sinus or throat pain  NECK: No pain or stiffness  BREASTS: No pain, masses, or nipple discharge  RESPIRATORY: No cough, wheezing, chills or hemoptysis; No shortness of breath  CARDIOVASCULAR: No chest pain, palpitations, dizziness, or leg swelling  GASTROINTESTINAL: No abdominal or epigastric pain. No nausea, vomiting, or hematemesis; No diarrhea or constipation. No melena or hematochezia.  GENITOURINARY: No dysuria, frequency, hematuria, or incontinence  NEUROLOGICAL: No headaches, memory loss, loss of strength, numbness, or tremors  SKIN: No itching, burning, rashes, or lesions   LYMPH NODES: No enlarged glands  ENDOCRINE: No heat or cold intolerance; No hair loss  MUSCULOSKELETAL: No joint pain or swelling; No muscle, back, or extremity pain  PSYCHIATRIC: No depression, anxiety, mood swings, or difficulty sleeping  HEME/LYMPH: No easy bruising, or bleeding gums  ALLERY AND IMMUNOLOGIC: No hives or eczema      MEDICATIONS  (STANDING):    MEDICATIONS  (PRN):      OBJECTIVE FINDINGS:    Vital Signs Last 24 Hrs  T(C): 36.7 (2019 08:32), Max: 37 (2019 15:47)  T(F): 98.1 (2019 08:32), Max: 98.6 (2019 15:47)  HR: 73 (2019 08:32) (65 - 77)  BP: 121/79 (2019 08:32) (116/77 - 145/90)  BP(mean): --  RR: 18 (2019 08:32) (18 - 18)  SpO2: 100% (2019 08:32) (99% - 100%)    Bariatric surgery status  No significant past surgical history      PE:  Gen: Comfortable, NAD  CV: RRR  Pulm: CTAB  Abd: Soft, NT  Ext: No edema or tenderness bilaterally  Spec Exam:    LABS:                        11.3   10.68 )-----------( 393      ( 2019 17:59 )             35.0     02-05    140  |  108  |  11  ----------------------------<  105<H>  3.9   |  23  |  0.49<L>    Ca    8.6      2019 17:59    TPro  7.7  /  Alb  3.4  /  TBili  0.6  /  DBili  x   /  AST  11<L>  /  ALT  14  /  AlkPhos  63  02-05    PT/INR - ( 2019 17:59 )   PT: 13.1 sec;   INR: 1.16 ratio         PTT - ( 2019 17:59 )  PTT:39.3 sec  Urinalysis Basic - ( 2019 17:52 )    Color: Yellow / Appearance: Clear / S.005 / pH: x  Gluc: x / Ketone: Negative  / Bili: Negative / Urobili: Negative mg/dL   Blood: x / Protein: Negative mg/dL / Nitrite: Negative   Leuk Esterase: Negative / RBC: 3-5 /HPF / WBC 3-5   Sq Epi: x / Non Sq Epi: Moderate / Bacteria: Moderate      Depression  Sciatica  Ovarian cyst  No pertinent past medical history      RADIOLOGY & ADDITIONAL STUDIES:      A/P: 33y G P   who presents with  -  -      ROB Mike, PGY2 see HPI

## 2019-02-06 NOTE — ASU DISCHARGE PLAN (ADULT/PEDIATRIC). - MEDICATION SUMMARY - MEDICATIONS TO TAKE
I will START or STAY ON the medications listed below when I get home from the hospital:    Motrin 600 mg oral tablet  -- 1 tab(s) by mouth every 6 hours, As Needed  -- Indication: For pain control as needed    Tylenol 325 mg oral tablet  -- 3 tab(s) by mouth every 6 hours, As Needed  -- Indication: For pain control as needed    oxyCODONE 5 mg oral tablet  -- 1 tab(s) by mouth every 6 hours, As Needed -for severe pain MDD:4 tabs   -- Caution federal law prohibits the transfer of this drug to any person other  than the person for whom it was prescribed.  It is very important that you take or use this exactly as directed.  Do not skip doses or discontinue unless directed by your doctor.  May cause drowsiness.  Alcohol may intensify this effect.  Use care when operating dangerous machinery.  This prescription cannot be refilled.  Using more of this medication than prescribed may cause serious breathing problems.    -- Indication: For pain control as needed    traZODone 50 mg oral tablet  -- 1 tab(s) by mouth once a day (at bedtime)  -- Indication: For Continuation    FLUoxetine 10 mg oral capsule  -- 1 cap(s) by mouth once a day  -- Indication: For Continuation

## 2019-02-06 NOTE — H&P ADULT - ATTENDING COMMENTS
Pt seen and examined  Agree with above    Pt with 8 cm right ov cyst with possible torsion.  Pt for Laparoscopic ovarian cystectomy possible oophorectomy possible bowel/bladder repair.      Pt understands risks/benefits of surgery including but not limited to bleeding, infection, injurty to bowel/bladder, nerve damage, blood tx and even death.  Pt has verbalized understanding of the above and has signed informed consent.    JONATAN Adamson

## 2019-02-06 NOTE — H&P ADULT - HISTORY OF PRESENT ILLNESS
R2 GYN CONSULT NOTE    33y  LMP 19 transferred from [place] for r/o torsion.  Patient initially presented to [place] after sudden onset RLQ abd pain.  States pain came on suddenly yesterday around 10pm, is sharp in nature like severe cramps, 10/10, exacerbated by movement, alleviated to a 7/10 w morphine.  Endorses chills, nausea. vomited 3 times since yesterday, also had an episode of emesis the day prior to pain onset, denies fevers, emesis,     Of note, pt states she has known she had ovarian cyst.  States was initially found during imaging for gastric sleeve.  [facility] surgeon saw the cyst during the surgery in May 2018, and it was approximately 4-5cm.  Pt was told to f/u w GYN, but has not.  [facility] last GYN visit was approx 2 years ago, w Dr. Joan Jansen who has since retired.    OB/GYN HISTORY: denies hx of pregnancies  GYNHx: denies fibroids, hx of cysts (see HPI), denies abnl pap smears, STIs    Surgical History:    Bariatric surgery status  No significant past surgical history    Past Medical History:   Depression  Sciatica  Ovarian cyst  No pertinent past medical history    Meds: trazodone,     Allergies: No Known Allergies    FAMILY HISTORY:  No pertinent family history in first degree relatives    Social History: occasionally smokes a cigarette maybe once a month, social drinker, uses marijuana in vape every other night to help her sleep

## 2019-02-06 NOTE — H&P ADULT - NSHPSOCIALHISTORY_GEN_ALL_CORE
occasionally smokes a cigarette maybe once a month, social drinker, uses marijuana in vape every other night to help her sleep

## 2019-02-06 NOTE — ED ADULT TRIAGE NOTE - CHIEF COMPLAINT QUOTE
Pt transfer from [place] . Pt with lower abdominal pain was sent over to r/o Torsion. Pts LMP 1/18. Pt arrives with L a/c 20 g iv.

## 2019-02-06 NOTE — BRIEF OPERATIVE NOTE - PROCEDURE
<<-----Click on this checkbox to enter Procedure Detorsion, ovary, laparoscopic  02/06/2019    Active  [id number]  Laparoscopic cystectomy of right ovary  02/06/2019  partial oophrectomy  Active  [id number]  Partial oophorectomy  02/06/2019  right  Active  [id number]

## 2019-02-06 NOTE — ED PROVIDER NOTE - OBJECTIVE STATEMENT
34 YO F hx of gastric sleeve May 2018, p/w rt sided abdominal pain since 10AM yesterday with associated nausea and vomiting. Pt initially went to [place], had a CT and US that was significant for a 7 cm ovarian cyst and pt transferred to [facility] for ob/gyn evaluation for possible torsion. Pt still endorses continued pain.

## 2019-02-06 NOTE — H&P ADULT - NSHPPHYSICALEXAM_GEN_ALL_CORE
OBJECTIVE FINDINGS:    Vital Signs Last 24 Hrs  T(C): 36.7 (06 Feb 2019 08:32), Max: 37 (05 Feb 2019 15:47)  T(F): 98.1 (06 Feb 2019 08:32), Max: 98.6 (05 Feb 2019 15:47)  HR: 73 (06 Feb 2019 08:32) (65 - 77)  BP: 121/79 (06 Feb 2019 08:32) (116/77 - 145/90)  RR: 18 (06 Feb 2019 08:32) (18 - 18)  SpO2: 100% (06 Feb 2019 08:32) (99% - 100%)      PE:  Gen: Comfortable, NAD  CV: RRR  Pulm: CTAB  Abd: Soft, TTP in RLQ, no rebound or guarding, +BS  Ext: No edema or tenderness bilaterally  Spec Exam: cervix wnl, no bleeding or discharge noted  Bimanual: -CMT, TTP in R adnexa, R adnexa fullness appreciated, L adnexa wnl

## 2019-02-06 NOTE — BRIEF OPERATIVE NOTE - OPERATION/FINDINGS
upper abdominal scan wnl, no adhesions noted   r ovary and tube torsed x2, R ovary w approx 8cm cyst, ovarian parenchyma grossly wnl, tube grossly nl  left ovary w multiple small cysts, otherwise wnl, left tube wnl

## 2019-02-06 NOTE — ED ADULT NURSE REASSESSMENT NOTE - NS ED NURSE REASSESS COMMENT FT1
Pt received as transfer from [facility], as per US reading. Patient has a cyst, r/o torsion. Patient reports 8/10 pain, pt assessed by MD Desir. Patient received IV Dilaudid 0.5 mg for pain control. Patient's VS WNL. Patient awaiting consult. Pt received as transfer from [facility], as per US reading. Patient has a cyst, r/o torsion. Patient reports 8/10 pain, pt assessed by MD Desir. Patient received IV Dilaudid 0.5 mg for pain control. Patient's VS WNL. Patient awaiting consult.

## 2019-02-06 NOTE — H&P ADULT - NSHPLABSRESULTS_GEN_ALL_CORE
LABS:                        11.3   10.68 )-----------( 393      ( 2019 17:59 )             35.0     02-    140  |  108  |  11  ----------------------------<  105<H>  3.9   |  23  |  0.49<L>    Ca    8.6      2019 17:59    TPro  7.7  /  Alb  3.4  /  TBili  0.6  /  DBili  x   /  AST  11<L>  /  ALT  14  /  AlkPhos  63  02-    PT/INR - ( 2019 17:59 )   PT: 13.1 sec;   INR: 1.16 ratio         PTT - ( 2019 17:59 )  PTT:39.3 sec  Urinalysis Basic - ( 2019 17:52 )    Color: Yellow / Appearance: Clear / S.005 / pH: x  Gluc: x / Ketone: Negative  / Bili: Negative / Urobili: Negative mg/dL   Blood: x / Protein: Negative mg/dL / Nitrite: Negative   Leuk Esterase: Negative / RBC: 3-5 /HPF / WBC 3-5   Sq Epi: x / Non Sq Epi: Moderate / Bacteria: Moderate

## 2019-02-06 NOTE — H&P ADULT - ASSESSMENT
A/P: 34yo  LMP  p/w RLQ pain, found to have 8cm ovarian cyst, w possible ovarian torsion, currently stable  - admit to GYN  - added on to OR schedule, awaiting OR time  - c/w NPO, LR@125  - T&Sx2, CBC  - continue monitoring vitals, clinical status  - antiemetics prn, pain meds prn  - appreciate ED care    Pt seen w Dr. Juan Diego Mike PGY2

## 2019-02-07 DIAGNOSIS — F32.9 MAJOR DEPRESSIVE DISORDER, SINGLE EPISODE, UNSPECIFIED: ICD-10-CM

## 2019-02-07 DIAGNOSIS — R10.9 UNSPECIFIED ABDOMINAL PAIN: ICD-10-CM

## 2019-02-07 DIAGNOSIS — N83.201 UNSPECIFIED OVARIAN CYST, RIGHT SIDE: ICD-10-CM

## 2019-02-15 ENCOUNTER — APPOINTMENT (OUTPATIENT)
Dept: OBGYN | Facility: CLINIC | Age: 34
End: 2019-02-15
Payer: MEDICARE

## 2019-02-15 VITALS
TEMPERATURE: 98.4 F | DIASTOLIC BLOOD PRESSURE: 81 MMHG | BODY MASS INDEX: 37.74 KG/M2 | HEIGHT: 63 IN | WEIGHT: 213 LBS | SYSTOLIC BLOOD PRESSURE: 118 MMHG

## 2019-02-15 DIAGNOSIS — R10.2 PELVIC AND PERINEAL PAIN: ICD-10-CM

## 2019-02-15 PROCEDURE — 99024 POSTOP FOLLOW-UP VISIT: CPT

## 2019-02-15 PROCEDURE — 17250 CHEM CAUT OF GRANLTJ TISSUE: CPT | Mod: 78

## 2019-02-15 RX ORDER — OXYCODONE AND ACETAMINOPHEN 5; 325 MG/1; MG/1
5-325 TABLET ORAL
Qty: 24 | Refills: 0 | Status: ACTIVE | COMMUNITY
Start: 2019-02-15 | End: 1900-01-01

## 2019-02-15 RX ORDER — OXYCODONE AND ACETAMINOPHEN 5; 325 MG/1; MG/1
5-325 TABLET ORAL
Qty: 18 | Refills: 0 | Status: DISCONTINUED | COMMUNITY
Start: 2019-02-15 | End: 2019-02-15

## 2019-02-15 NOTE — PHYSICAL EXAM
[FreeTextEntry9] : Ab - umbilical incision with excess granulation tissue; no purulence; no edema; other two incisions healing well

## 2019-02-15 NOTE — CHIEF COMPLAINT
[Initial Visit] : initial GYN visit [FreeTextEntry1] : 32 y/o  LMP last week with c/o of discharge from surgical site.  Pt S/P laparoscopic ovarian cystetomy last week for ovarian torsion.  Pt with no GI/ issues.  \par \par PMH - not sig\par PSH - gastric sleeve 2018\par POB - not sig\par Meds - None\par \par

## 2019-02-22 ENCOUNTER — APPOINTMENT (OUTPATIENT)
Dept: OBGYN | Facility: CLINIC | Age: 34
End: 2019-02-22
Payer: MEDICARE

## 2019-02-22 VITALS
DIASTOLIC BLOOD PRESSURE: 75 MMHG | SYSTOLIC BLOOD PRESSURE: 108 MMHG | HEART RATE: 69 BPM | BODY MASS INDEX: 37.56 KG/M2 | HEIGHT: 63 IN | WEIGHT: 212 LBS

## 2019-02-22 PROCEDURE — 99024 POSTOP FOLLOW-UP VISIT: CPT

## 2019-02-22 RX ORDER — CEPHALEXIN 500 MG/1
500 CAPSULE ORAL 3 TIMES DAILY
Qty: 30 | Refills: 0 | Status: ACTIVE | COMMUNITY
Start: 2019-02-22 | End: 1900-01-01

## 2019-03-03 NOTE — CHIEF COMPLAINT
[Follow Up] : follow up GYN visit [FreeTextEntry1] : Pt here for F/U of wound after surgery for ovarian torsion.  Pt feels better.  No GI/ issues.  No fevers/vomioting.  Pt states that the pain around the umbilicus is a little better.  Pt with no other changes in last week.

## 2019-04-10 ENCOUNTER — APPOINTMENT (OUTPATIENT)
Dept: OBGYN | Facility: CLINIC | Age: 34
End: 2019-04-10
Payer: MEDICARE

## 2019-04-10 VITALS
SYSTOLIC BLOOD PRESSURE: 115 MMHG | DIASTOLIC BLOOD PRESSURE: 79 MMHG | BODY MASS INDEX: 35.79 KG/M2 | WEIGHT: 202 LBS | HEART RATE: 90 BPM | HEIGHT: 63 IN

## 2019-04-10 DIAGNOSIS — L03.90 CELLULITIS, UNSPECIFIED: ICD-10-CM

## 2019-04-10 PROCEDURE — 99213 OFFICE O/P EST LOW 20 MIN: CPT

## 2019-04-10 NOTE — CHIEF COMPLAINT
[Follow Up] : follow up GYN visit [FreeTextEntry1] : pt with persistent granulation tissue at umbilicus s/p laparoscopy

## 2019-04-12 ENCOUNTER — APPOINTMENT (OUTPATIENT)
Dept: OBGYN | Facility: CLINIC | Age: 34
End: 2019-04-12

## 2019-07-30 ENCOUNTER — RESULT REVIEW (OUTPATIENT)
Age: 34
End: 2019-07-30

## 2019-11-24 NOTE — ED ADULT TRIAGE NOTE - PRO INTERPRETER NEED 2
Subjective:       Patient ID: Sofia Batres is a 71 y.o. female.    Chief Complaint: Nail Problem and Foot Problem   Patient presents today for new patient foot exam she is complaining of a bunion deformity on the right foot that has been causing her irritation and discomfort she also has ingrown toenails on both big toes.    Past Medical History:   Diagnosis Date    Bipolar disorder      Past Surgical History:   Procedure Laterality Date    TONSILLECTOMY       Family History   Problem Relation Age of Onset    Heart disease Mother     Heart disease Father     Cancer Sister      Social History     Socioeconomic History    Marital status:      Spouse name: Not on file    Number of children: Not on file    Years of education: Not on file    Highest education level: Not on file   Occupational History    Not on file   Social Needs    Financial resource strain: Not on file    Food insecurity:     Worry: Not on file     Inability: Not on file    Transportation needs:     Medical: Not on file     Non-medical: Not on file   Tobacco Use    Smoking status: Former Smoker     Years: 30.00    Smokeless tobacco: Never Used   Substance and Sexual Activity    Alcohol use: Not Currently    Drug use: Never    Sexual activity: Yes     Partners: Male   Lifestyle    Physical activity:     Days per week: Not on file     Minutes per session: Not on file    Stress: Not on file   Relationships    Social connections:     Talks on phone: Not on file     Gets together: Not on file     Attends [employer] service: Not on file     Active member of club or organization: Not on file     Attends meetings of clubs or organizations: Not on file     Relationship status: Not on file   Other Topics Concern    Not on file   Social History Narrative    Not on file       Current Outpatient Medications   Medication Sig Dispense Refill    atorvastatin (LIPITOR) 20 MG tablet       carvedilol (COREG) 3.125 MG tablet        "divalproex ER (DEPAKOTE) 500 MG Tb24       fluticasone propionate (FLONASE) 50 mcg/actuation nasal spray       lisinopril (PRINIVIL,ZESTRIL) 2.5 MG tablet       ondansetron (ZOFRAN) 8 MG tablet       QUEtiapine (SEROQUEL) 100 MG Tab       sertraline (ZOLOFT) 50 MG tablet        No current facility-administered medications for this visit.      Review of patient's allergies indicates:   Allergen Reactions    Pcn [penicillins]        Review of Systems   Musculoskeletal: Positive for joint swelling.   All other systems reviewed and are negative.      Objective:      Vitals:    11/20/19 1333   BP: 118/72   Pulse: 62   Temp: 98 °F (36.7 °C)   Weight: 52.2 kg (115 lb)   Height: 4' 11" (1.499 m)     Physical Exam   Constitutional: She appears well-developed and well-nourished.   Cardiovascular:   Pulses:       Dorsalis pedis pulses are 1+ on the right side.        Posterior tibial pulses are 1+ on the right side, and 1+ on the left side.   Pulmonary/Chest: Effort normal.   Musculoskeletal: She exhibits edema, tenderness and deformity.        Right foot: There is decreased range of motion and deformity.        Left foot: There is decreased range of motion and deformity.   Feet:   Right Foot:   Protective Sensation: 4 sites tested. 4 sites sensed.   Skin Integrity: Positive for erythema and warmth.   Left Foot:   Protective Sensation: 4 sites tested. 4 sites sensed.   Neurological: She is alert.   Skin: Skin is warm. Capillary refill takes 2 to 3 seconds.   Psychiatric: She has a normal mood and affect. Her behavior is normal. Judgment and thought content normal.   Nursing note and vitals reviewed.           Assessment:       1. Valgus deformity of both great toes    2. Hammer toe, unspecified laterality    3. Ingrown nail    4. Osteoarthritis of ankle and foot, unspecified laterality        Plan:       Patient presents today for new patient evaluation she is not a diabetic she states she has had some irritation " recently over the bunion deformity on her right foot it is red it is swollen she has a bunion can't with additional degenerative changes palpable spurring noted she states she needs to be very careful about the types of shoes that she wears to prevent irritation and rubbing.  Complete examination was performed bilateral lower extremity no open wounds skin breaks noted patient does have several ingrowing toenails both medial lateral border of the bilateral hallux there is discomfort noted upon palpation with early signs of infection the ingrowing toenail was able to be debrided and removed the patient did not need a nail avulsion at this time I have recommended close monitoring of this area any increased redness swelling pain discomfort patient is to contact us immediately.  Total face-to-face time equaled 20 min.This note was created using [employer] voice recognition software that occasionally misinterpreted phrases or words.   English

## 2020-02-11 NOTE — ED ADULT NURSE NOTE - PT NEEDS ASSIST
Aaliyah Alan in for a follow-up for HFpEF. Patient presents with diarrhea for the past 2 days. She also continues to have a productive cough.   Medications reviewed and teaching completed.  Aaliyah Alan to take extra potassium 60mEq today. Starting tomorrow, increase potassium to 60mEq daily.  Patient to RTC in 2 month(s) with labs.      no

## 2020-09-03 NOTE — ED ADULT NURSE NOTE - ALCOHOL USE HISTORY SINGLE SELECT
[Alert] : alert [Acute Distress] : no acute distress [Awake] : awake [Mass] : no breast mass [Axillary LAD] : no axillary lymphadenopathy [Nipple Discharge] : no nipple discharge [Soft] : soft [Tender] : non tender [Distended] : not distended [Oriented x3] : oriented to person, place, and time [Depressed Mood] : not depressed [Flat Affect] : affect not flat [Normal] : uterus [Labia Majora] : labia major [No Bleeding] : there was no active vaginal bleeding [Labia Minora] : labia minora [Pap Obtained] : a Pap smear was performed [Tenderness] : nontender [Adnexa Tenderness] : were not tender never

## 2020-09-24 ENCOUNTER — TRANSCRIPTION ENCOUNTER (OUTPATIENT)
Age: 35
End: 2020-09-24

## 2020-09-24 ENCOUNTER — OUTPATIENT (OUTPATIENT)
Dept: OUTPATIENT SERVICES | Facility: HOSPITAL | Age: 35
LOS: 1 days | End: 2020-09-24

## 2020-09-24 VITALS
WEIGHT: 203.05 LBS | OXYGEN SATURATION: 98 % | HEIGHT: 62 IN | HEART RATE: 83 BPM | RESPIRATION RATE: 16 BRPM | TEMPERATURE: 97 F | SYSTOLIC BLOOD PRESSURE: 120 MMHG | DIASTOLIC BLOOD PRESSURE: 80 MMHG

## 2020-09-24 DIAGNOSIS — M20.11 HALLUX VALGUS (ACQUIRED), RIGHT FOOT: ICD-10-CM

## 2020-09-24 DIAGNOSIS — K21.9 GASTRO-ESOPHAGEAL REFLUX DISEASE WITHOUT ESOPHAGITIS: ICD-10-CM

## 2020-09-24 DIAGNOSIS — M06.9 RHEUMATOID ARTHRITIS, UNSPECIFIED: ICD-10-CM

## 2020-09-24 DIAGNOSIS — Z98.890 OTHER SPECIFIED POSTPROCEDURAL STATES: Chronic | ICD-10-CM

## 2020-09-24 DIAGNOSIS — Z98.84 BARIATRIC SURGERY STATUS: Chronic | ICD-10-CM

## 2020-09-24 DIAGNOSIS — F32.9 MAJOR DEPRESSIVE DISORDER, SINGLE EPISODE, UNSPECIFIED: ICD-10-CM

## 2020-09-24 LAB
ANION GAP SERPL CALC-SCNC: 15 MMO/L — HIGH (ref 7–14)
APTT BLD: 41.9 SEC — HIGH (ref 27–36.3)
BUN SERPL-MCNC: 13 MG/DL — SIGNIFICANT CHANGE UP (ref 7–23)
CALCIUM SERPL-MCNC: 9.8 MG/DL — SIGNIFICANT CHANGE UP (ref 8.4–10.5)
CHLORIDE SERPL-SCNC: 101 MMOL/L — SIGNIFICANT CHANGE UP (ref 98–107)
CO2 SERPL-SCNC: 25 MMOL/L — SIGNIFICANT CHANGE UP (ref 22–31)
CREAT SERPL-MCNC: 0.56 MG/DL — SIGNIFICANT CHANGE UP (ref 0.5–1.3)
GLUCOSE SERPL-MCNC: 92 MG/DL — SIGNIFICANT CHANGE UP (ref 70–99)
HCG UR-SCNC: NEGATIVE — SIGNIFICANT CHANGE UP
HCT VFR BLD CALC: 36.3 % — SIGNIFICANT CHANGE UP (ref 34.5–45)
HGB BLD-MCNC: 11.5 G/DL — SIGNIFICANT CHANGE UP (ref 11.5–15.5)
INR BLD: 1.13 — SIGNIFICANT CHANGE UP (ref 0.88–1.16)
MCHC RBC-ENTMCNC: 27.6 PG — SIGNIFICANT CHANGE UP (ref 27–34)
MCHC RBC-ENTMCNC: 31.7 % — LOW (ref 32–36)
MCV RBC AUTO: 87.3 FL — SIGNIFICANT CHANGE UP (ref 80–100)
NRBC # FLD: 0 K/UL — SIGNIFICANT CHANGE UP (ref 0–0)
PLATELET # BLD AUTO: 372 K/UL — SIGNIFICANT CHANGE UP (ref 150–400)
PMV BLD: 10 FL — SIGNIFICANT CHANGE UP (ref 7–13)
POTASSIUM SERPL-MCNC: 4.1 MMOL/L — SIGNIFICANT CHANGE UP (ref 3.5–5.3)
POTASSIUM SERPL-SCNC: 4.1 MMOL/L — SIGNIFICANT CHANGE UP (ref 3.5–5.3)
PROTHROM AB SERPL-ACNC: 12.9 SEC — SIGNIFICANT CHANGE UP (ref 10.6–13.6)
RBC # BLD: 4.16 M/UL — SIGNIFICANT CHANGE UP (ref 3.8–5.2)
RBC # FLD: 13.2 % — SIGNIFICANT CHANGE UP (ref 10.3–14.5)
SODIUM SERPL-SCNC: 141 MMOL/L — SIGNIFICANT CHANGE UP (ref 135–145)
SP GR UR: 1.02 — SIGNIFICANT CHANGE UP (ref 1–1.04)
WBC # BLD: 10.66 K/UL — HIGH (ref 3.8–10.5)
WBC # FLD AUTO: 10.66 K/UL — HIGH (ref 3.8–10.5)

## 2020-09-24 RX ORDER — ACETAMINOPHEN 500 MG
3 TABLET ORAL
Qty: 0 | Refills: 0 | DISCHARGE

## 2020-09-24 RX ORDER — IBUPROFEN 200 MG
1 TABLET ORAL
Qty: 0 | Refills: 0 | DISCHARGE

## 2020-09-24 RX ORDER — FLUOXETINE HCL 10 MG
1 CAPSULE ORAL
Qty: 0 | Refills: 0 | DISCHARGE

## 2020-09-24 RX ORDER — TRAZODONE HCL 50 MG
1 TABLET ORAL
Qty: 0 | Refills: 0 | DISCHARGE

## 2020-09-24 NOTE — H&P PST ADULT - NSSTREETDRUGTY_GEN_ALL_CORE_SD
medical marijuana program ; I joint @ night medical marijuana program ; I joint @ night pt  to hold pre op

## 2020-09-24 NOTE — H&P PST ADULT - NS PRO LAST MENSTRUAL DATE
[facility] Medicine Services  PROGRESS NOTE    Patient Name: Analilia De Leon  : 1933  MRN: [id number]    Date of Admission: 2020  Primary Care Physician: Sravan Franco APRN    Subjective   Subjective     CC:  Hip Fracture    HPI:  Doing ok.  No issues reported today.  COVID-19 screen negative.  Sounds like has ambulance arranged for tomorrow.  No bleeding reported    Review of Systems    Gen- No fevers, chills  CV- No chest pain, palpitations  Resp- No cough, dyspnea  GI- No N/V/D, abd pain    Objective   Objective     Vital Signs:   Temp:  [97.7 °F (36.5 °C)-98.1 °F (36.7 °C)] 98.1 °F (36.7 °C)  Heart Rate:  [] 83  Resp:  [16-18] 18  BP: (135-162)/(52-98) 162/83        Physical Exam:    Constitutional: No acute distress, awake, alert  HENT: NCAT, mucous membranes moist  Respiratory: Clear to auscultation bilaterally, respiratory effort normal   Cardiovascular: RRR, no murmurs, rubs, or gallops, palpable pedal pulses bilaterally  Gastrointestinal: Positive bowel sounds, soft, nontender, nondistended  Musculoskeletal: No bilateral ankle edema  Psychiatric: Appropriate affect, cooperative  Neurologic: Oriented x 3, strength symmetric in all extremities, Cranial Nerves grossly intact to confrontation, speech clear  Skin: No rashes    Results Reviewed:  Results from last 7 days   Lab Units 20  0654 20  0819 20  0649  20  1241  20   WBC 10*3/mm3 11.31* 10.90* 12.25*   < > 17.02*   < >  --    HEMOGLOBIN g/dL 9.6* 9.4* 9.6*   < > 8.1*   < >  --    HEMATOCRIT % 32.5* 31.4* 31.5*   < > 27.1*   < >  --    PLATELETS 10*3/mm3 184 198 187   < > 159   < >  --    INR   --   --   --   --  1.48*  --  1.28*   PROCALCITONIN ng/mL  --   --   --   --   --   --  0.07    < > = values in this interval not displayed.     Results from last 7 days   Lab Units 20  0654 20  0819 20  0649  20  0507  20  2028   SODIUM mmol/L 137 137  136   < > 137   < > 138   POTASSIUM mmol/L 4.5 3.9 4.1   < > 5.5*   < > 5.6*   CHLORIDE mmol/L 100 104 100   < > 109*   < > 106   CO2 mmol/L 28.0 25.0 23.0   < > 19.0*   < > 21.0*   BUN mg/dL 21 24* 33*   < > 21   < > 13   CREATININE mg/dL 0.85 0.78 0.88   < > 1.03*   < > 0.99   GLUCOSE mg/dL 150* 124* 205*   < > 197*   < > 178*   CALCIUM mg/dL 8.3* 8.5* 8.6   < > 8.6   < > 9.2   ALT (SGPT) U/L  --   --   --   --   --   --  15   AST (SGOT) U/L  --   --   --   --   --   --  23   TROPONIN T ng/mL  --   --   --   --  <0.010  --  <0.010   PROBNP pg/mL  --   --   --   --  9,136.0*  --   --     < > = values in this interval not displayed.     Estimated Creatinine Clearance: 40 mL/min (by C-G formula based on SCr of 0.85 mg/dL).    Microbiology Results Abnormal     Procedure Component Value - Date/Time    COVID PRE-OP / PRE-PROCEDURE SCREENING ORDER (NO ISOLATION) - Swab, Nasopharynx [[id number]] Collected:  08/17/20 1445    Lab Status:  Final result Specimen:  Swab from Nasopharynx Updated:  08/17/20 1518    Narrative:       The following orders were created for panel order COVID PRE-OP / PRE-PROCEDURE SCREENING ORDER (NO ISOLATION) - Swab, Nasopharynx.  Procedure                               Abnormality         Status                     ---------                               -----------         ------                     COVID-19, ABBOTT IN-HOUS...[[id number]]  Normal              Final result                 Please view results for these tests on the individual orders.    COVID-19, ABBOTT IN-HOUSE,NP Swab (NO TRANSPORT MEDIA) 2 HR TAT - Swab, Nasopharynx [[id number]]  (Normal) Collected:  08/17/20 1445    Lab Status:  Final result Specimen:  Swab from Nasopharynx Updated:  08/17/20 1518     COVID19 Not Detected    Narrative:       Fact sheet for providers: https://www.fda.gov/media/889610/download     Fact sheet for patients: https://www.fda.gov/media/143336/download    Blood Culture - Blood, Hand, Left [[id number]]  Collected:  08/11/20 2054    Lab Status:  Final result Specimen:  Blood from Hand, Left Updated:  08/17/20 0815     Blood Culture No growth at 5 days    Blood Culture - Blood, Hand, Left [[id number]] Collected:  08/12/20 0013    Lab Status:  Final result Specimen:  Blood from Hand, Left Updated:  08/17/20 0015     Blood Culture No growth at 5 days    MRSA Screen, PCR (Inpatient) - Swab, Nares [[id number]]  (Normal) Collected:  08/14/20 1117    Lab Status:  Final result Specimen:  Swab from Nares Updated:  08/14/20 1252     MRSA PCR Negative    Narrative:       MRSA Negative    Urine Culture - Urine, Urine, Catheter In/Out [[id number]]  (Normal) Collected:  08/13/20 0627    Lab Status:  Final result Specimen:  Urine, Catheter In/Out Updated:  08/14/20 0725     Urine Culture No growth    Urine Culture - Urine, Urine, Catheter In/Out [[id number]]  (Normal) Collected:  08/12/20 0138    Lab Status:  Final result Specimen:  Urine, Catheter In/Out Updated:  08/13/20 1309     Urine Culture No growth    COVID PRE-OP / PRE-PROCEDURE SCREENING ORDER (NO ISOLATION) - Swab, Nasopharynx [[id number]] Collected:  08/12/20 0038    Lab Status:  Final result Specimen:  Swab from Nasopharynx Updated:  08/12/20 0142    Narrative:       The following orders were created for panel order COVID PRE-OP / PRE-PROCEDURE SCREENING ORDER (NO ISOLATION) - Swab, Nasopharynx.  Procedure                               Abnormality         Status                     ---------                               -----------         ------                     COVID-19, ABBOTT IN-HOUS...[[id number]]  Normal              Final result                 Please view results for these tests on the individual orders.    COVID-19, ABBOTT IN-HOUSE,NP Swab (NO TRANSPORT MEDIA) 2 HR TAT - Swab, Nasopharynx [[id number]]  (Normal) Collected:  08/12/20 0038    Lab Status:  Final result Specimen:  Swab from Nasopharynx Updated:  08/12/20 0142     COVID19 Not Detected    Narrative:        Fact sheet for providers: https://www.fda.gov/media/423500/download     Fact sheet for patients: https://www.fda.gov/media/232345/download        Imaging Results (Last 24 Hours)     ** No results found for the last 24 hours. **        Results for orders placed during the hospital encounter of 08/11/20   Adult Transthoracic Echo Limited W/ Cont if Necessary Per Protocol    Narrative · Left ventricular systolic function is normal. Estimated EF = 50%.  · Severe biatrial enlargement.  · Severe tricuspid valve regurgitation is present.  · Severe pulmonary hypertension is present. Estimated right ventricular   systolic pressure from tricuspid regurgitation is markedly elevated (83   mmHg).        I have reviewed the medications:  Scheduled Meds:  atenolol 50 mg Oral Q24H   insulin detemir 10 Units Subcutaneous Nightly   insulin lispro 0-7 Units Subcutaneous 4x Daily With Meals & Nightly   insulin lispro 5 Units Subcutaneous TID With Meals   rivaroxaban 15 mg Oral BID With Meals   Followed by      [START ON 9/6/2020] rivaroxaban 20 mg Oral Daily With Dinner   sodium chloride 10 mL Intravenous Q12H   sodium zirconium cyclosilicate 10 g Oral Once   tamsulosin 0.4 mg Oral Daily     Continuous Infusions:  lactated ringers 9 mL/hr Last Rate: 9 mL/hr (08/12/20 1510)     PRN Meds:.•  acetaminophen  •  acetaminophen  •  bisacodyl  •  dextrose  •  dextrose  •  glucagon (human recombinant)  •  HYDROcodone-acetaminophen  •  lactated ringers  •  magnesium hydroxide  •  Morphine  •  naloxone  •  promethazine  •  sodium chloride    Assessment/Plan   Assessment & Plan     Active Hospital Problems    Diagnosis  POA   • **Hip fracture (CMS/HCC) [S72.009A]  Yes   • Acute pulmonary embolism (CMS/HCC) [I26.99]  Unknown   • Pulmonary hypertension (CMS/HCC) [I27.20]  Unknown   • Tricuspid regurgitation [I07.1]  Unknown   • Acute respiratory failure with hypoxia (CMS/HCC) [J96.01]  Unknown   • Status post hip hemiarthroplasty [Z96.649]  Not  Applicable   • Acute UTI (urinary tract infection) [N39.0]  Yes   • Altered mental status [R41.82]  Yes   • Fall [W19.XXXA]  Unknown   • Atrial fibrillation (CMS/HCC) [I48.91]  Yes   • Hyperlipidemia [E78.5]  Yes   • Hypertension [I10]  Yes   • CAD (coronary artery disease) [I25.10]  Yes   • Iron deficiency anemia [D50.9]  Yes      Resolved Hospital Problems   No resolved problems to display.        Brief Hospital Course to date:  Analilia De Leon is a 87 y.o. female with history of coronary artery disease, hypertension, hyperlipidemia, as well as paroxysmal atrial fibrillation on Xarelto who presented as a transfer from outside hospital due to acute right-sided hip fracture.  Postop patient had acute hypoxic respiratory failure initially requiring nonrebreather and patient had altered mental status that improved with IV Narcan.  She appears stable and ready for transfer to rehab    Right hip fracture due to mechanical fall  -She is status post surgery for hip fracture with Dr. Lopez  -As needed morphine for pain control  -Case management for rehab  -CT of the head unremarkable  -COVID preprocedure swab was negative    Acute hypoxic respiratory failure  -Multifactorial issue  -Acute PE, left pulmonary edema, severe pulmonary pretension  -proBNP 9000 suspect flash pulmonary edema due to A. fib RVR  -Monitor volume status  -Transition from heparin drip back to Xarelto    Sepsis present admission  -Peers to be resolved  -Blood cultures no growth to date    Acute blood loss anemia  -Received 2 units of PRBCs on 8/15 for hemoglobin of 6.8  -Condition Dr. Nathan    UTI  - IV Ceftriaxone  - no growth on Urine Culture  - should be able to stop abx at discharge    COVID-19 screen prior to discharge - Not Detected (negative)    DVT Prophylaxis: Coagulated    Disposition: I expect the patient to be discharged transfer to [facility] tomorrow afternoon.  [facility] transport around 3:30 pm    CODE STATUS:   Code Status and Medical  Interventions:   Ordered at: 08/12/20 2020     Code Status:    CPR     Medical Interventions (Level of Support Prior to Arrest):    Full         Electronically signed by Dre Ramirez MD, 08/18/20, 19:08.     1 week ago ; pt denies chance of pregnancy

## 2020-09-24 NOTE — H&P PST ADULT - HISTORY OF PRESENT ILLNESS
Pt is a 35 y.o. female ; h/o bunion right foot ; pt reports increasing pain; pt to surgeon ; an xray was done ; pt now presents for Right Bunion Assisted with Screw Fixation Pt is a 35 y.o. female ; h/o bunion right foot ; pt reports increasing pain; pt to surgeon ; an xray was done ; pt now presents for surgery

## 2020-09-24 NOTE — H&P PST ADULT - NSICDXPASTMEDICALHX_GEN_ALL_CORE_FT
PAST MEDICAL HISTORY:  Depression     Diverticulitis > 3 years ago    GERD (gastroesophageal reflux disease)     Obesity s/p Gastic Sleeve 2018 ; pt reports 120 lb weight loss    Ovarian cyst     Rheumatoid arthritis dx > 2 years ago    Sciatica prior to 2018

## 2020-09-24 NOTE — H&P PST ADULT - NSICDXPASTSURGICALHX_GEN_ALL_CORE_FT
PAST SURGICAL HISTORY:  Bariatric surgery status s/p gastric Sleeve 2018    S/P ovarian cystectomy 2018

## 2020-09-24 NOTE — H&P PST ADULT - ACTIVITY
pt walks 7 days week 30 blocks 3 x week ; pt walks dog 0/5 mile day ; pt exercises at home , jogs in place

## 2020-09-24 NOTE — H&P PST ADULT - NEGATIVE NEUROLOGICAL SYMPTOMS
no syncope/no weakness/no paresthesias/no generalized seizures/no transient paralysis/no focal seizures

## 2020-09-24 NOTE — H&P PST ADULT - NSCAFFEINETYPE_GEN_ALL_CORE_SD
Emergency Department    [address] [facility] [address]    Phone:  [phone]    Fax:  [phone]                                       Antonio Elder   MRN: [id number]    Department:   Emergency Department   Date of Visit:  1/30/2018           Patient Information     Date Of Birth          1973        Your diagnoses for this visit were:     Acute left-sided low back pain without sciatica        You were seen by Zana Gramajo MD and Renato Angeles MD.      Follow-up Information     Follow up with Your Primary Care Doctor In 5 days.        Follow up with  Emergency Department.    Specialty:  EMERGENCY MEDICINE    Why:  As needed, If symptoms worsen    Contact information:    [address] [facility] [address] [phone]        Discharge Instructions         Back Pain (Acute or Chronic)    Back pain is one of the most common problems. The good news is that most people feel better in 1 to 2 weeks, and most of the rest in 1 to 2 months. Most people can remain active.  People experience and describe pain differently; not everyone is the same.    The pain can be sharp, stabbing, shooting, aching, cramping or burning.    Movement, standing, bending, lifting, sitting, or walking may worsen pain.    It can be localized to one spot or area, or it can be more generalized.    It can spread or radiate upwards, to the front, or go down your arms or legs (sciatica).    It can cause muscle spasm.  Most of the time, mechanical problems with the muscles or spine cause the pain. Mechanical problems are usually caused by an injury to the muscles or ligaments. While illness can cause back pain, it is usually not caused by a serious illness. Mechanical problems include:     Physical activity such as sports, exercise, work, or normal activity    Overexertion, lifting, pushing, pulling incorrectly or too aggressively    Sudden twisting, bending, or stretching from an  accident, or accidental movement    Poor posture    Stretching or moving wrong, without noticing pain at the time    Poor coordination, lack of regular exercise (check with your doctor about this)    Spinal disc disease or arthritis    Stress  Pain can also be related to pregnancy, or illness like appendicitis, bladder or kidney infections, pelvic infections, and many other things.  Acute back pain usually gets better in 1 to 2 weeks. Back pain related to disk disease, arthritis in the spinal joints or spinal stenosis (narrowing of the spinal canal) can become chronic and last for months or years.  Unless you had a physical injury (for example, a car accident or fall) X-rays are usually not needed for the initial evaluation of back pain. If pain continues and does not respond to medical treatment, X-rays and other tests may be needed.  Home care  Try these home care recommendations:    When in bed, try to find a position of comfort. A firm mattress is best. Try lying flat on your back with pillows under your knees. You can also try lying on your side with your knees bent up towards your chest and a pillow between your knees.    At first, do not try to stretch out the sore spots. If there is a strain, it is not like the good soreness you get after exercising without an injury. In this case, stretching may make it worse.    Avoid prolong sitting, long car rides, or travel. This puts more stress on the lower back than standing or walking.    During the first 24 to 72 hours after an acute injury or flare up of chronic back pain, apply an ice pack to the painful area for 20 minutes and then remove it for 20 minutes. Do this over a period of 60 to 90 minutes or several times a day. This will reduce swelling and pain. Wrap the ice pack in a thin towel or plastic to protect your skin.    You can start with ice, then switch to heat. Heat (hot shower, hot bath, or heating pad) reduces pain and works well for muscle spasms.  Heat can be applied to the painful area for 20 minutes then remove it for 20 minutes. Do this over a period of 60 to 90 minutes or several times a day. Do not sleep on a heating pad. It can lead to skin burns or tissue damage.    You can alternate ice and heat therapy. Talk with your doctor about the best treatment for your back pain.    Therapeutic massage can help relax the back muscles without stretching them.    Be aware of safe lifting methods and do not lift anything without stretching first.  Medicines  Talk to your doctor before using medicine, especially if you have other medical problems or are taking other medicines.    You may use over-the-counter medicine as directed on the bottle to control pain, unless another pain medicine was prescribed. If you have chronic conditions like diabetes, liver or kidney disease, stomach ulcers, or gastrointestinal bleeding, or are taking blood thinners, talk to your doctor before taking any medicine.    Be careful if you are given a prescription medicines, narcotics, or medicine for muscle spasms. They can cause drowsiness, affect your coordination, reflexes, and judgement. Do not drive or operate heavy machinery.  Follow-up care  Follow up with your healthcare provider, or as advised.   A radiologist will review any X-rays that were taken. Your provide will notify you of any new findings that may affect your care.  Call 911  Call emergency services if any of the following occur:    Trouble breathing    Confusion    Very drowsy or trouble awakening    Fainting or loss of consciousness    Rapid or very slow heart rate    Loss of bowel or bladder control  When to seek medical advice  Call your healthcare provider right away if any of these occur:     Pain becomes worse or spreads to your legs    Weakness or numbness in one or both legs    Numbness in the groin or genital area  Date Last Reviewed: 7/1/2016 2000-2017 [employer] [address] [facility],  [place] [address]. All rights reserved. This information is not intended as a substitute for professional medical care. Always follow your healthcare professional's instructions.          Exercises to Strengthen Your Lower Back  Strong lower back and abdominal muscles work together to support your spine. The exercises below will help strengthen the lower back. It is important that you begin exercising slowly and increase levels gradually.  Always begin any exercise program with stretching. If you feel pain while doing any of these exercises, stop and talk to your doctor about a more specific exercise program that better suits your condition.   Low back stretch  The point of stretching is to make you more flexible and increase your range of motion. Stretch only as much as you are able. Stretch slowly. Do not push your stretch to the limit. If at any point you feel pain while stretching, this is your (temporary) limit.    Lie on your back with your knees bent and both feet on the ground.    Slowly raise your left knee to your chest as you flatten your lower back against the floor. Hold for 5 seconds.    Relax and repeat the exercise with your right knee.    Do 10 of these exercises for each leg.    Repeat hugging both knees to your chest at the same time.  Building lower back strength  Start your exercise routine with 10 to 30 minutes a day, 1 to 3 times a day.  Initial exercises  Lying on your back:  1. Ankle pumps: Move your foot up and down, towards your head, and then away. Repeat 10 times with each foot.  2. Heel slides: Slowly bend your knee, drawing the heel of your foot towards you. Then slide your heel/foot from you, straightening your knee. Do not lift your foot off the floor (this is not a leg lift).  3. Abdominal contraction: Bend your knees and put your hands on your stomach. Tighten your stomach muscles. Hold for 5 seconds, then relax. Repeat 10 times.  4. Straight leg raise: Bend one leg at the knee and  keep the other leg straight. Tighten your stomach muscles. Slowly lift your straight leg 6 to 12 inches off the floor and hold for up to 5 seconds. Repeat 10 times on each side.  Standin. Wall squats: Stand with your back against the wall. Move your feet about 12 inches away from the wall. Tighten your stomach muscles, and slowly bend your knees until they are at about a 45 degree angle. Do not go down too far. Hold about 5 seconds. Then slowly return to your starting position. Repeat 10 times.  2. Heel raises: Stand facing the wall. Slowly raise the heels of your feet up and down, while keeping your toes on the floor. If you have trouble balancing, you can touch the wall with your hands. Repeat 10 times.  More advanced exercises  When you feel comfortable enough, try these exercises.  1. Kneeling lumbar extension: Begin on your hands and knees. At the same time, raise and straighten your right arm and left leg until they are parallel to the ground. Hold for 2 seconds and come back slowly to a starting position. Repeat with left arm and right leg, alternating 10 times.  2. Prone lumbar extension: Lie face down, arms extended overhead, palms on the floor. At the same time, raise your right arm and left leg as high as comfortably possible. Hold for 10 seconds and slowly return to start. Repeat with left arm and right leg, alternating 10 times. Gradually build up to 20 times. (Advanced: Repeat this exercise raising both arms and both legs a few inches off the floor at the same time. Hold for 5 seconds and release.)  3. Pelvic tilt: Lie on the floor on your back with your knees bent at 90 degrees. Your feet should be flat on the floor. Inhale, exhale, then slowly contract your abdominal muscles bringing your navel toward your spine. Let your pelvis rock back until your lower back is flat on the floor. Hold for 10 seconds while breathing smoothly.  4. Abdominal crunch: Perform a pelvic tilt (above) flattening your  lower back against the floor. Holding the tension in your abdominal muscles, take another breath and raise your shoulder blades off the ground (this is not a full sit-up). Keep your head in line with your body (don t bend your neck forward). Hold for 2 seconds, then slowly lower.  Date Last Reviewed: 6/1/2016 2000-2017 [employer]. [address] [place] [address]. All rights reserved. This information is not intended as a substitute for professional medical care. Always follow your healthcare professional's instructions.    Opioid Medication Information    You have been given a prescription for an opioid (narcotic) pain medicine and/or have received a pain medicine while here in the Emergency Department. These medicines can make you drowsy or impaired. You must not drive, operate dangerous equipment, or engage in any other dangerous activities while taking these medications. If you drive while taking these medications, you could be arrested for DUI, or driving under the influence. Do not drink any alcohol while you are taking these medications.   Opioid pain medications can cause addiction. If you have a history of chemical dependency of any type, you are at a higher risk of becoming addicted to pain medications.  Only take these prescribed medications to treat your pain when all other options have been tried. Take it for as short a time and as few doses as possible. Store your pain pills in a secure place, as they are frequently stolen and provide a dangerous opportunity for children or visitors in your house to start abusing these powerful medications. We will not replace any lost or stolen medicine.  As soon as your pain is better, you should flush all your remaining medication.   Many prescription pain medications contain Tylenol  (acetaminophen), including Vicodin , Tylenol #3 , Norco , Lortab , and Percocet .  You should not take any extra pills of Tylenol  if you are using these  prescription medications or you can get very sick.  Do not ever take more than 4000 mg of acetaminophen in any 24 hour period.  All opioids tend to cause constipation. Drink plenty of water and eat foods that have a lot of fiber, such as fruits, vegetables, prune juice, apple juice and high fiber cereal.  Take a laxative if you don t move your bowels at least every other day. Miralax , Milk of Magnesia, Colace , or Senna  can be used to keep you regular.                24 Hour Appointment Hotline       To make an appointment at any [facility], call [phone] ([phone]). If you don't have a family doctor or clinic, we will help you find one. [place] clinics are conveniently located to serve the needs of you and your family.             Review of your medicines      START taking        Dose / Directions Last dose taken    HYDROcodone-acetaminophen 5-325 MG per tablet   Commonly known as:  NORCO   Dose:  1-2 tablet   Quantity:  8 tablet        Take 1-2 tablets by mouth every 6 hours as needed   Refills:  0        methocarbamol 500 MG tablet   Commonly known as:  ROBAXIN   Dose:  500 mg   Quantity:  15 tablet        Take 1 tablet (500 mg) by mouth 3 times daily as needed   Refills:  0          Our records show that you are taking the medicines listed below. If these are incorrect, please call your family doctor or clinic.        Dose / Directions Last dose taken    albuterol 108 (90 BASE) MCG/ACT Inhaler   Commonly known as:  PROAIR HFA/PROVENTIL HFA/VENTOLIN HFA   Dose:  2 puff   Quantity:  3 Inhaler        Inhale 2 puffs into the lungs every 6 hours as needed for shortness of breath / dyspnea or wheezing   Refills:  1        amLODIPine 10 MG tablet   Commonly known as:  NORVASC   Quantity:  30 tablet        TAKE 1 TABLET BY MOUTH ONCE DAILY   Refills:  0        atorvastatin 20 MG tablet   Commonly known as:  LIPITOR   Dose:  20 mg   Quantity:  90 tablet        Take 1 tablet (20 mg) by mouth daily    Refills:  3        cloNIDine 0.1 MG/24HR WK patch   Commonly known as:  CATAPRES-TTS1   Dose:  1 patch   Quantity:  12 patch        Place 1 patch onto the skin once a week   Refills:  3        losartan-hydrochlorothiazide 100-25 MG per tablet   Commonly known as:  HYZAAR   Dose:  1 tablet   Quantity:  90 tablet        Take 1 tablet by mouth daily   Refills:  3        montelukast 5 MG chewable tablet   Commonly known as:  SINGULAIR   Dose:  5 mg   Quantity:  90 tablet        Take 1 tablet (5 mg) by mouth At Bedtime   Refills:  3        scopolamine 72 hr patch   Commonly known as:  TRANSDERM   Quantity:  4 patch        Place 1 patch onto the skin every 72 hours.  Apply to hairless area behind one ear at least 4 hours before travel.  change every 3 days.   Refills:  0        UNABLE TO FIND        MEDICATION NAME: Men's Vitamin take 1 tablet daily.   Refills:  0        valACYclovir 1000 mg tablet   Commonly known as:  VALTREX   Dose:  2000 mg   Quantity:  4 tablet        Take 2 tablets (2,000 mg) by mouth 2 times daily   Refills:  3                Prescriptions were sent or printed at these locations (2 Prescriptions)                   Other Prescriptions                Printed at Department/Unit printer (2 of 2)         HYDROcodone-acetaminophen (NORCO) 5-325 MG per tablet               methocarbamol (ROBAXIN) 500 MG tablet                Procedures and tests performed during your visit     UA with Microscopic reflex to Culture      Orders Needing Specimen Collection     None      Pending Results     No orders found from 1/28/2018 to 1/31/2018.            Pending Culture Results     No orders found from 1/28/2018 to 1/31/2018.            Pending Results Instructions     If you had any lab results that were not finalized at the time of your Discharge, you can call the ED Lab Result RN at [phone]. You will be contacted by this team for any positive Lab results or changes in treatment. The nurses are available 7  days a week from 10A to 6:30P.  You can leave a message 24 hours per day and they will return your call.        Test Results From Your Hospital Stay        1/30/2018  6:06 AM      Component Results     Component Value Ref Range & Units Status    Color Urine Yellow  Final    Appearance Urine Clear  Final    Glucose Urine Negative NEG^Negative mg/dL Final    Bilirubin Urine Negative NEG^Negative Final    Ketones Urine Negative NEG^Negative mg/dL Final    Specific Gravity Urine 1.018 1.003 - 1.035 Final    Blood Urine Trace (A) NEG^Negative Final    pH Urine 5.5 5.0 - 7.0 pH Final    Protein Albumin Urine Negative NEG^Negative mg/dL Final    Urobilinogen mg/dL Normal 0.0 - 2.0 mg/dL Final    Nitrite Urine Negative NEG^Negative Final    Leukocyte Esterase Urine Negative NEG^Negative Final    Source Midstream Urine  Final    WBC Urine <1 0 - 2 /HPF Final    RBC Urine 1 0 - 2 /HPF Final    Mucous Urine Present (A) NEG^Negative /LPF Final                Clinical Quality Measure: Blood Pressure Screening     Your blood pressure was checked while you were in the emergency department today. The last reading we obtained was  BP: (!) 150/95 . Please read the guidelines below about what these numbers mean and what you should do about them.  If your systolic blood pressure (the top number) is less than 120 and your diastolic blood pressure (the bottom number) is less than 80, then your blood pressure is normal. There is nothing more that you need to do about it.  If your systolic blood pressure (the top number) is 120-139 or your diastolic blood pressure (the bottom number) is 80-89, your blood pressure may be higher than it should be. You should have your blood pressure rechecked within a year by a primary care provider.  If your systolic blood pressure (the top number) is 140 or greater or your diastolic blood pressure (the bottom number) is 90 or greater, you may have high blood pressure. High blood pressure is treatable, but  if left untreated over time it can put you at risk for heart attack, stroke, or kidney failure. You should have your blood pressure rechecked by a primary care provider within the next 4 weeks.  If your provider in the emergency department today gave you specific instructions to follow-up with your doctor or provider even sooner than that, you should follow that instruction and not wait for up to 4 weeks for your follow-up visit.        Thank you for choosing [facility]       Thank you for choosing [facility] for your care. Our goal is always to provide you with excellent care. Hearing back from our patients is one way we can continue to improve our services. Please take a few minutes to complete the written survey that you may receive in the mail after you visit with us. Thank you!        [facility] Information     [employer] gives you secure access to your electronic health record. If you see a primary care provider, you can also send messages to your care team and make appointments. If you have questions, please call your primary care clinic.  If you do not have a primary care provider, please call [phone] and they will assist you.        Care EveryWhere ID     This is your Care EveryWhere ID. This could be used by other organizations to access your [facility] medical records  [id number]        Equal Access to Services     RICHARD BLOUNT AH: Hadii nicol Evans, lester schilling, jordan redman, vania tompkins. So [facility] [phone].    ATENCIÓN: Si habla español, tiene a mccarthy disposición servicios gratuitos de asistencia lingüística. Kehinde al [phone].    We comply with applicable federal civil rights laws and [place] laws. We do not discriminate on the basis of race, color, national origin, age, disability, sex, sexual orientation, or gender identity.            After Visit Summary       This is your record. Keep this with you and show to your community pharmacist(s) and  doctor(s) at your next visit.                   coffee

## 2020-09-24 NOTE — H&P PST ADULT - NSICDXPROBLEM_GEN_ALL_CORE_FT
PROBLEM DIAGNOSES  Problem: Hallux valgus, right  Assessment and Plan: Procedure as booked; Right Bunion Assisted with Screw Fixation  Pre op instructions reviewed with pt and significant  other including Pepcid and Hibiclens with teach back ; both verbalized good understanding of pre op instructions  Request recent note Dr Radha Brown provided for ucg dos       Problem: Rheumatoid arthritis  Assessment and Plan: Pt to continue Prednisone as rx by pcp     Problem: GERD (gastroesophageal reflux disease)  Assessment and Plan: Pt to take Omeprazole dos     Problem: Depression  Assessment and Plan: Pt to take Fluoxetine dos

## 2020-09-28 DIAGNOSIS — Z01.818 ENCOUNTER FOR OTHER PREPROCEDURAL EXAMINATION: ICD-10-CM

## 2020-09-29 ENCOUNTER — APPOINTMENT (OUTPATIENT)
Dept: DISASTER EMERGENCY | Facility: CLINIC | Age: 35
End: 2020-09-29

## 2020-09-30 LAB — SARS-COV-2 N GENE NPH QL NAA+PROBE: NOT DETECTED

## 2020-10-01 ENCOUNTER — TRANSCRIPTION ENCOUNTER (OUTPATIENT)
Age: 35
End: 2020-10-01

## 2020-10-01 VITALS
TEMPERATURE: 98 F | HEART RATE: 65 BPM | RESPIRATION RATE: 18 BRPM | SYSTOLIC BLOOD PRESSURE: 105 MMHG | OXYGEN SATURATION: 100 % | WEIGHT: 203.05 LBS | DIASTOLIC BLOOD PRESSURE: 67 MMHG | HEIGHT: 62 IN

## 2020-10-02 ENCOUNTER — RESULT REVIEW (OUTPATIENT)
Age: 35
End: 2020-10-02

## 2020-10-02 ENCOUNTER — OUTPATIENT (OUTPATIENT)
Dept: OUTPATIENT SERVICES | Facility: HOSPITAL | Age: 35
LOS: 1 days | Discharge: HOME | End: 2020-10-02
Payer: MEDICARE

## 2020-10-02 VITALS
OXYGEN SATURATION: 99 % | SYSTOLIC BLOOD PRESSURE: 105 MMHG | RESPIRATION RATE: 16 BRPM | DIASTOLIC BLOOD PRESSURE: 73 MMHG | HEART RATE: 77 BPM

## 2020-10-02 DIAGNOSIS — M20.11 HALLUX VALGUS (ACQUIRED), RIGHT FOOT: ICD-10-CM

## 2020-10-02 DIAGNOSIS — Z98.84 BARIATRIC SURGERY STATUS: Chronic | ICD-10-CM

## 2020-10-02 DIAGNOSIS — Z98.890 OTHER SPECIFIED POSTPROCEDURAL STATES: Chronic | ICD-10-CM

## 2020-10-02 PROCEDURE — 88311 DECALCIFY TISSUE: CPT | Mod: 26

## 2020-10-02 PROCEDURE — 73630 X-RAY EXAM OF FOOT: CPT | Mod: 26,RT

## 2020-10-02 PROCEDURE — 88304 TISSUE EXAM BY PATHOLOGIST: CPT | Mod: 26

## 2020-10-02 RX ORDER — OMEPRAZOLE 10 MG/1
1 CAPSULE, DELAYED RELEASE ORAL
Qty: 0 | Refills: 0 | DISCHARGE

## 2020-10-02 RX ORDER — FOLIC ACID 0.8 MG
5 TABLET ORAL
Qty: 0 | Refills: 0 | DISCHARGE

## 2020-10-02 RX ORDER — FLUOXETINE HCL 10 MG
1 CAPSULE ORAL
Qty: 0 | Refills: 0 | DISCHARGE

## 2020-10-02 RX ORDER — PREDNISOLONE 5 MG
1 TABLET ORAL
Qty: 0 | Refills: 0 | DISCHARGE

## 2020-10-02 NOTE — ASU DISCHARGE PLAN (ADULT/PEDIATRIC) - CALL YOUR DOCTOR IF YOU HAVE ANY OF THE FOLLOWING:
Fever greater than (need to indicate Fahrenheit or Celsius) Swelling that gets worse/Bleeding that does not stop/Numbness, tingling, color or temperature change to extremity/Fever greater than (need to indicate Fahrenheit or Celsius)/Wound/Surgical Site with redness, or foul smelling discharge or pus/Nausea and vomiting that does not stop/Unable to urinate/Pain not relieved by Medications

## 2020-10-02 NOTE — ASU DISCHARGE PLAN (ADULT/PEDIATRIC) - ASU DC SPECIAL INSTRUCTIONSFT
Follow up in office within the next week as scheduled with Dr. Sunil Quach  [address], [place]  [phone]

## 2020-10-02 NOTE — ASU DISCHARGE PLAN (ADULT/PEDIATRIC) - ACTIVITY LEVEL
Weight bearing as tolerated Elevate extremity/heel walk only, use surgical shoe at all times/Weight bearing as tolerated

## 2020-10-02 NOTE — ASU DISCHARGE PLAN (ADULT/PEDIATRIC) - MEDICATION INSTRUCTIONS
narcotics may cause nausea - take with food. Take stool softener and increase fiber and fluids to prevent constipation.

## 2020-10-08 NOTE — ED ADULT NURSE NOTE - CHPI ED NUR TIMING2

## 2022-09-26 NOTE — ED ADULT NURSE NOTE - NS ED NURSE LEVEL OF CONSCIOUSNESS ORIENTATION
Oriented - self; Oriented - place; Oriented - time Colchicine Counseling:  Patient counseled regarding adverse effects including but not limited to stomach upset (nausea, vomiting, stomach pain, or diarrhea).  Patient instructed to limit alcohol consumption while taking this medication.  Colchicine may reduce blood counts especially with prolonged use.  The patient understands that monitoring of kidney function and blood counts may be required, especially at baseline. The patient verbalized understanding of the proper use and possible adverse effects of colchicine.  All of the patient's questions and concerns were addressed.

## 2023-06-06 PROBLEM — M54.30 SCIATICA, UNSPECIFIED SIDE: Chronic | Status: ACTIVE | Noted: 2017-06-27

## 2023-06-06 PROBLEM — M06.9 RHEUMATOID ARTHRITIS, UNSPECIFIED: Chronic | Status: ACTIVE | Noted: 2020-09-24

## 2023-06-06 PROBLEM — E66.9 OBESITY, UNSPECIFIED: Chronic | Status: ACTIVE | Noted: 2020-09-24

## 2023-06-06 PROBLEM — K57.92 DIVERTICULITIS OF INTESTINE, PART UNSPECIFIED, WITHOUT PERFORATION OR ABSCESS WITHOUT BLEEDING: Chronic | Status: ACTIVE | Noted: 2020-09-24

## 2023-06-06 PROBLEM — K21.9 GASTRO-ESOPHAGEAL REFLUX DISEASE WITHOUT ESOPHAGITIS: Chronic | Status: ACTIVE | Noted: 2020-09-24

## 2023-06-07 ENCOUNTER — NON-APPOINTMENT (OUTPATIENT)
Age: 38
End: 2023-06-07

## 2023-06-07 ENCOUNTER — LABORATORY RESULT (OUTPATIENT)
Age: 38
End: 2023-06-07

## 2023-06-07 ENCOUNTER — APPOINTMENT (OUTPATIENT)
Dept: DERMATOLOGY | Facility: CLINIC | Age: 38
End: 2023-06-07
Payer: MEDICARE

## 2023-06-07 DIAGNOSIS — L73.9 FOLLICULAR DISORDER, UNSPECIFIED: ICD-10-CM

## 2023-06-07 DIAGNOSIS — L02.91 CUTANEOUS ABSCESS, UNSPECIFIED: ICD-10-CM

## 2023-06-07 PROCEDURE — 10061 I&D ABSCESS COMP/MULTIPLE: CPT

## 2023-06-07 PROCEDURE — 99203 OFFICE O/P NEW LOW 30 MIN: CPT | Mod: 25

## 2023-06-07 RX ORDER — DOXYCYCLINE HYCLATE 100 MG/1
100 TABLET ORAL
Qty: 14 | Refills: 0 | Status: ACTIVE | COMMUNITY
Start: 2023-06-07 | End: 1900-01-01

## 2023-06-07 RX ORDER — CHLORHEXIDINE GLUCONATE 2% 2 G/100ML
2 SOLUTION TOPICAL
Qty: 1 | Refills: 0 | Status: ACTIVE | COMMUNITY
Start: 2023-06-07 | End: 1900-01-01

## 2023-06-08 ENCOUNTER — APPOINTMENT (OUTPATIENT)
Dept: DERMATOLOGY | Facility: CLINIC | Age: 38
End: 2023-06-08
Payer: MEDICARE

## 2023-06-08 ENCOUNTER — NON-APPOINTMENT (OUTPATIENT)
Age: 38
End: 2023-06-08

## 2023-06-08 PROCEDURE — 99213 OFFICE O/P EST LOW 20 MIN: CPT

## 2023-06-13 ENCOUNTER — NON-APPOINTMENT (OUTPATIENT)
Age: 38
End: 2023-06-13

## 2023-06-14 ENCOUNTER — APPOINTMENT (OUTPATIENT)
Dept: DERMATOLOGY | Facility: CLINIC | Age: 38
End: 2023-06-14

## 2023-08-17 ENCOUNTER — APPOINTMENT (OUTPATIENT)
Dept: DERMATOLOGY | Facility: CLINIC | Age: 38
End: 2023-08-17

## 2023-10-11 NOTE — ASU DISCHARGE PLAN (ADULT/PEDIATRIC). - NOTIFY
show Inability to Tolerate Liquids or Foods/Bleeding that does not stop/Pain not relieved by Medications/Persistent Nausea and Vomiting/GYN Fever>100.4

## 2024-04-16 NOTE — ED ADULT NURSE NOTE - PAIN: PRESENCE, MLM
complains of pain/discomfort Alert-The patient is alert, awake and responds to voice. The patient is oriented to time, place, and person. The triage nurse is able to obtain subjective information.